# Patient Record
Sex: MALE | Race: WHITE | NOT HISPANIC OR LATINO
[De-identification: names, ages, dates, MRNs, and addresses within clinical notes are randomized per-mention and may not be internally consistent; named-entity substitution may affect disease eponyms.]

---

## 2019-02-05 ENCOUNTER — TRANSCRIPTION ENCOUNTER (OUTPATIENT)
Age: 54
End: 2019-02-05

## 2019-02-07 ENCOUNTER — TRANSCRIPTION ENCOUNTER (OUTPATIENT)
Age: 54
End: 2019-02-07

## 2019-03-08 ENCOUNTER — APPOINTMENT (OUTPATIENT)
Dept: FAMILY MEDICINE | Facility: CLINIC | Age: 54
End: 2019-03-08

## 2019-05-21 ENCOUNTER — APPOINTMENT (OUTPATIENT)
Dept: FAMILY MEDICINE | Facility: CLINIC | Age: 54
End: 2019-05-21
Payer: COMMERCIAL

## 2019-05-21 ENCOUNTER — NON-APPOINTMENT (OUTPATIENT)
Age: 54
End: 2019-05-21

## 2019-05-21 VITALS
SYSTOLIC BLOOD PRESSURE: 130 MMHG | BODY MASS INDEX: 28.77 KG/M2 | OXYGEN SATURATION: 99 % | WEIGHT: 179 LBS | TEMPERATURE: 98.4 F | DIASTOLIC BLOOD PRESSURE: 88 MMHG | HEART RATE: 84 BPM | HEIGHT: 66 IN

## 2019-05-21 PROCEDURE — 99212 OFFICE O/P EST SF 10 MIN: CPT

## 2019-05-21 NOTE — HISTORY OF PRESENT ILLNESS
[FreeTextEntry8] : Mr Clay Guerrero is a 54 yo male presents today to establish care. As per patient, he states he used to see Dr. Vuong  his previous PCP, however, recently he has been unable to contact or follow up with him, states that the practice has closed. Pt has prior medical history of anxiety and msk/chronic pain syndrome. Pt states he does not want a complete physical today and intends to return next month. Today all he wants is prescription renewal for his oxycodone 20 mg daily. Pt was advised due to his chronic pain syndrome, and his need for chronic pain therapy, pt is to be referred to pain management specialist. Pt made aware, we treat for acute pain, and as per guidelines since pt does not have any signs of acute severe injury and also not a cancer patient with a terminal diagnosis, a referral to pain management is in order. Pt also highly advised the dangers of combining xanax with oxycodone. Pt states he has had ongoing anxiety issues for many years. Pt advised a referral to behavioral management and psychiatrist is also optimal. \par Pt left abruptly, states he does not want a complete physical/blood work today, and would rather do it when he come in again in June 2019, upon which point he intends to get medications refilled as well.

## 2019-05-25 ENCOUNTER — TRANSCRIPTION ENCOUNTER (OUTPATIENT)
Age: 54
End: 2019-05-25

## 2019-06-14 ENCOUNTER — APPOINTMENT (OUTPATIENT)
Dept: FAMILY MEDICINE | Facility: CLINIC | Age: 54
End: 2019-06-14

## 2019-09-18 ENCOUNTER — APPOINTMENT (OUTPATIENT)
Dept: FAMILY MEDICINE | Facility: CLINIC | Age: 54
End: 2019-09-18
Payer: COMMERCIAL

## 2019-09-18 ENCOUNTER — RX RENEWAL (OUTPATIENT)
Age: 54
End: 2019-09-18

## 2019-09-18 VITALS
BODY MASS INDEX: 27.97 KG/M2 | SYSTOLIC BLOOD PRESSURE: 120 MMHG | TEMPERATURE: 98.5 F | OXYGEN SATURATION: 96 % | WEIGHT: 174 LBS | HEIGHT: 66 IN | HEART RATE: 88 BPM | DIASTOLIC BLOOD PRESSURE: 82 MMHG

## 2019-09-18 VITALS — RESPIRATION RATE: 14 BRPM

## 2019-09-18 PROCEDURE — 93000 ELECTROCARDIOGRAM COMPLETE: CPT

## 2019-09-18 PROCEDURE — 90674 CCIIV4 VAC NO PRSV 0.5 ML IM: CPT

## 2019-09-18 PROCEDURE — 99386 PREV VISIT NEW AGE 40-64: CPT | Mod: 25

## 2019-09-18 PROCEDURE — 36415 COLL VENOUS BLD VENIPUNCTURE: CPT

## 2019-09-18 PROCEDURE — G0008: CPT

## 2019-09-18 NOTE — HISTORY OF PRESENT ILLNESS
[FreeTextEntry1] : Comprehensive health maintenance, physical exam [de-identified] : Patient is a 53-year-old gentleman, who presents today for comprehensive health maintenance. Physical exam patient states that he is in his usual state of health, unfortunate patient has a chronic pain syndrome secondary to an injury which was sustained at work. Presently, the patient is awake, alert, and oriented x3 in no acute distress. He is calm and cooperative. He denies any chest pain, shortness of breath, nausea, or vomiting. Patient does complain of left shoulder and left ankle pain status post injury.

## 2019-09-18 NOTE — HISTORY OF PRESENT ILLNESS
[de-identified] : Patient is a 53-year-old gentleman, who presents today for comprehensive health maintenance. Physical exam patient states that he is in his usual state of health, unfortunate patient has a chronic pain syndrome secondary to an injury which was sustained at work. Presently, the patient is awake, alert, and oriented x3 in no acute distress. He is calm and cooperative. He denies any chest pain, shortness of breath, nausea, or vomiting. Patient does complain of left shoulder and left ankle pain status post injury. [FreeTextEntry1] : Comprehensive health maintenance, physical exam

## 2019-09-18 NOTE — REVIEW OF SYSTEMS
[Joint Pain] : joint pain [Joint Stiffness] : joint stiffness [Negative] : Heme/Lymph [Muscle Pain] : no muscle pain [Muscle Weakness] : no muscle weakness [Joint Swelling] : no joint swelling [Back Pain] : no back pain

## 2019-09-18 NOTE — COUNSELING
[Adequate lighting] : Adequate lighting [Fall prevention counseling provided] : Fall prevention counseling provided [No throw rugs] : No throw rugs [Use proper foot wear] : Use proper foot wear [Sleep ___ hours/day] : Sleep [unfilled] hours/day [Behavioral health counseling provided] : Behavioral health counseling provided [Plan in advance] : Plan in advance [Engage in a relaxing activity] : Engage in a relaxing activity [AUDIT-C Screening administered and reviewed] : AUDIT-C Screening administered and reviewed [None] : None [Good understanding] : Patient has a good understanding of lifestyle changes and steps needed to achieve self management goal

## 2019-09-18 NOTE — HEALTH RISK ASSESSMENT
[Good] : ~his/her~ current health as good [Very Good] : ~his/her~  mood as very good [2 - 4 times a month (2 pts)] : 2-4 times a month (2 points) [Yes] : Yes [Never (0 pts)] : Never (0 points) [1 or 2 (0 pts)] : 1 or 2 (0 points) [No] : In the past 12 months have you used drugs other than those required for medical reasons? No [Any fall with injury in past year] : Patient reported fall with injury in the past year [0] : 1) Little interest or pleasure doing things: Not at all (0) [Patient reported colonoscopy was abnormal] : Patient reported colonoscopy was abnormal [HIV Test offered] : HIV Test offered [Hepatitis C test offered] : Hepatitis C test offered [Alone] : lives alone [None] : None [Employed] : employed [High School] : high school [] :  [Sexually Active] : sexually active [Fully functional (bathing, dressing, toileting, transferring, walking, feeding)] : Fully functional (bathing, dressing, toileting, transferring, walking, feeding) [Feels Safe at Home] : Feels safe at home [Fully functional (using the telephone, shopping, preparing meals, housekeeping, doing laundry, using] : Fully functional and needs no help or supervision to perform IADLs (using the telephone, shopping, preparing meals, housekeeping, doing laundry, using transportation, managing medications and managing finances) [Reports normal functional visual acuity (ie: able to read med bottle)] : Reports normal functional visual acuity [Carbon Monoxide Detector] : carbon monoxide detector [Smoke Detector] : smoke detector [Safety elements used in home] : safety elements used in home [Sunscreen] : uses sunscreen [Seat Belt] :  uses seat belt [Reviewed updated] : Reviewed updated [Designated Healthcare Proxy] : Designated healthcare proxy [Name: ___] : Health Care Proxy's Name: [unfilled]  [Relationship: ___] : Relationship: [unfilled] [Aggressive treatment] : aggressive treatment [] : No [Audit-CScore] : 2 [[id number]] : 0 [Change in mental status noted] : No change in mental status noted [Language] : denies difficulty with language [Behavior] : denies difficulty with behavior [Learning/Retaining New Information] : denies difficulty learning/retaining new information [Handling Complex Tasks] : denies difficulty handling complex tasks [Reasoning] : denies difficulty with reasoning [Spatial Ability and Orientation] : denies difficulty with spatial ability and orientation [High Risk Behavior] : no high risk behavior [Reports changes in vision] : Reports no changes in vision [Reports changes in hearing] : Reports no changes in hearing [Reports changes in dental health] : Reports no changes in dental health [Guns at Home] : no guns at home [Travel to Developing Areas] : does not  travel to developing areas [Caregiver Concerns] : does not have caregiver concerns [TB Exposure] : is not being exposed to tuberculosis [ColonoscopyDate] : 09/17 [ColonoscopyComments] : Colitis [AdvancecareDate] : 09/19

## 2019-09-18 NOTE — HEALTH RISK ASSESSMENT
[Good] : ~his/her~ current health as good [Very Good] : ~his/her~  mood as very good [2 - 4 times a month (2 pts)] : 2-4 times a month (2 points) [Yes] : Yes [1 or 2 (0 pts)] : 1 or 2 (0 points) [Never (0 pts)] : Never (0 points) [Any fall with injury in past year] : Patient reported fall with injury in the past year [No] : In the past 12 months have you used drugs other than those required for medical reasons? No [0] : 1) Little interest or pleasure doing things: Not at all (0) [HIV Test offered] : HIV Test offered [Patient reported colonoscopy was abnormal] : Patient reported colonoscopy was abnormal [Hepatitis C test offered] : Hepatitis C test offered [Alone] : lives alone [None] : None [Employed] : employed [High School] : high school [] :  [Sexually Active] : sexually active [Fully functional (bathing, dressing, toileting, transferring, walking, feeding)] : Fully functional (bathing, dressing, toileting, transferring, walking, feeding) [Feels Safe at Home] : Feels safe at home [Fully functional (using the telephone, shopping, preparing meals, housekeeping, doing laundry, using] : Fully functional and needs no help or supervision to perform IADLs (using the telephone, shopping, preparing meals, housekeeping, doing laundry, using transportation, managing medications and managing finances) [Reports normal functional visual acuity (ie: able to read med bottle)] : Reports normal functional visual acuity [Carbon Monoxide Detector] : carbon monoxide detector [Smoke Detector] : smoke detector [Safety elements used in home] : safety elements used in home [Seat Belt] :  uses seat belt [Sunscreen] : uses sunscreen [Reviewed updated] : Reviewed updated [Name: ___] : Health Care Proxy's Name: [unfilled]  [Designated Healthcare Proxy] : Designated healthcare proxy [Aggressive treatment] : aggressive treatment [Relationship: ___] : Relationship: [unfilled] [Audit-CScore] : 2 [] : No [[id number]] : 0 [Change in mental status noted] : No change in mental status noted [Language] : denies difficulty with language [Behavior] : denies difficulty with behavior [Learning/Retaining New Information] : denies difficulty learning/retaining new information [Reasoning] : denies difficulty with reasoning [Handling Complex Tasks] : denies difficulty handling complex tasks [Spatial Ability and Orientation] : denies difficulty with spatial ability and orientation [High Risk Behavior] : no high risk behavior [Reports changes in hearing] : Reports no changes in hearing [Reports changes in vision] : Reports no changes in vision [Guns at Home] : no guns at home [Reports changes in dental health] : Reports no changes in dental health [Travel to Developing Areas] : does not  travel to developing areas [Caregiver Concerns] : does not have caregiver concerns [TB Exposure] : is not being exposed to tuberculosis [ColonoscopyComments] : Colitis [ColonoscopyDate] : 09/17 [AdvancecareDate] : 09/19

## 2019-09-18 NOTE — PHYSICAL EXAM
[No Acute Distress] : no acute distress [Well Nourished] : well nourished [Well Developed] : well developed [Normal Voice/Communication] : normal voice/communication [Well-Appearing] : well-appearing [PERRL] : pupils equal round and reactive to light [Normal Sclera/Conjunctiva] : normal sclera/conjunctiva [Normal Outer Ear/Nose] : the outer ears and nose were normal in appearance [EOMI] : extraocular movements intact [Normal Oropharynx] : the oropharynx was normal [Normal TMs] : both tympanic membranes were normal [No JVD] : no jugular venous distention [Normal Nasal Mucosa] : the nasal mucosa was normal [Supple] : supple [No Lymphadenopathy] : no lymphadenopathy [No Respiratory Distress] : no respiratory distress  [Thyroid Normal, No Nodules] : the thyroid was normal and there were no nodules present [No Accessory Muscle Use] : no accessory muscle use [Clear to Auscultation] : lungs were clear to auscultation bilaterally [Normal Rate] : normal rate  [Regular Rhythm] : with a regular rhythm [Normal S1, S2] : normal S1 and S2 [No Murmur] : no murmur heard [No Carotid Bruits] : no carotid bruits [No Varicosities] : no varicosities [No Abdominal Bruit] : a ~M bruit was not heard ~T in the abdomen [No Edema] : there was no peripheral edema [Pedal Pulses Present] : the pedal pulses are present [No Extremity Clubbing/Cyanosis] : no extremity clubbing/cyanosis [No Palpable Aorta] : no palpable aorta [Soft] : abdomen soft [Non-distended] : non-distended [Non Tender] : non-tender [No Masses] : no abdominal mass palpated [No HSM] : no HSM [No Hernias] : no hernias [Normal Bowel Sounds] : normal bowel sounds [Declined Rectal Exam] : declined rectal exam [Normal Supraclavicular Nodes] : no supraclavicular lymphadenopathy [Normal Posterior Cervical Nodes] : no posterior cervical lymphadenopathy [Normal Axillary Nodes] : no axillary lymphadenopathy [Normal Anterior Cervical Nodes] : no anterior cervical lymphadenopathy [Normal Inguinal Nodes] : no inguinal lymphadenopathy [Normal Femoral Nodes] : no femoral lymphadenopathy [No CVA Tenderness] : no CVA  tenderness [No Spinal Tenderness] : no spinal tenderness [Grossly Normal Strength/Tone] : grossly normal strength/tone [No Joint Swelling] : no joint swelling [No Rash] : no rash [No Focal Deficits] : no focal deficits [Coordination Grossly Intact] : coordination grossly intact [Deep Tendon Reflexes (DTR)] : deep tendon reflexes were 2+ and symmetric [Normal Gait] : normal gait [Normal Affect] : the affect was normal [Speech Grossly Normal] : speech grossly normal [Alert and Oriented x3] : oriented to person, place, and time [Normal Mood] : the mood was normal [Normal Insight/Judgement] : insight and judgment were intact [de-identified] : Left shoulder range of motion decreased secondary to pain, status post fall work injury torn tendons [de-identified] : Positive Tinel sign, occult tunnel syndrome, and cubital tunnel syndrome

## 2019-09-18 NOTE — PHYSICAL EXAM
[No Acute Distress] : no acute distress [Well Nourished] : well nourished [Well Developed] : well developed [Well-Appearing] : well-appearing [Normal Voice/Communication] : normal voice/communication [Normal Sclera/Conjunctiva] : normal sclera/conjunctiva [PERRL] : pupils equal round and reactive to light [EOMI] : extraocular movements intact [Normal Outer Ear/Nose] : the outer ears and nose were normal in appearance [Normal TMs] : both tympanic membranes were normal [Normal Oropharynx] : the oropharynx was normal [Normal Nasal Mucosa] : the nasal mucosa was normal [No JVD] : no jugular venous distention [Supple] : supple [No Lymphadenopathy] : no lymphadenopathy [No Respiratory Distress] : no respiratory distress  [Thyroid Normal, No Nodules] : the thyroid was normal and there were no nodules present [Clear to Auscultation] : lungs were clear to auscultation bilaterally [No Accessory Muscle Use] : no accessory muscle use [Regular Rhythm] : with a regular rhythm [Normal Rate] : normal rate  [Normal S1, S2] : normal S1 and S2 [No Carotid Bruits] : no carotid bruits [No Murmur] : no murmur heard [No Abdominal Bruit] : a ~M bruit was not heard ~T in the abdomen [No Varicosities] : no varicosities [Pedal Pulses Present] : the pedal pulses are present [No Edema] : there was no peripheral edema [No Palpable Aorta] : no palpable aorta [No Extremity Clubbing/Cyanosis] : no extremity clubbing/cyanosis [Soft] : abdomen soft [Non-distended] : non-distended [Non Tender] : non-tender [No HSM] : no HSM [No Masses] : no abdominal mass palpated [No Hernias] : no hernias [Normal Bowel Sounds] : normal bowel sounds [Declined Rectal Exam] : declined rectal exam [Normal Supraclavicular Nodes] : no supraclavicular lymphadenopathy [Normal Posterior Cervical Nodes] : no posterior cervical lymphadenopathy [Normal Axillary Nodes] : no axillary lymphadenopathy [Normal Anterior Cervical Nodes] : no anterior cervical lymphadenopathy [Normal Inguinal Nodes] : no inguinal lymphadenopathy [Normal Femoral Nodes] : no femoral lymphadenopathy [No Spinal Tenderness] : no spinal tenderness [No CVA Tenderness] : no CVA  tenderness [Grossly Normal Strength/Tone] : grossly normal strength/tone [No Joint Swelling] : no joint swelling [No Rash] : no rash [No Focal Deficits] : no focal deficits [Coordination Grossly Intact] : coordination grossly intact [Deep Tendon Reflexes (DTR)] : deep tendon reflexes were 2+ and symmetric [Normal Gait] : normal gait [Speech Grossly Normal] : speech grossly normal [Normal Affect] : the affect was normal [Alert and Oriented x3] : oriented to person, place, and time [Normal Mood] : the mood was normal [Normal Insight/Judgement] : insight and judgment were intact [de-identified] : Left shoulder range of motion decreased secondary to pain, status post fall work injury torn tendons [de-identified] : Positive Tinel sign, occult tunnel syndrome, and cubital tunnel syndrome

## 2019-09-18 NOTE — COUNSELING
[Fall prevention counseling provided] : Fall prevention counseling provided [Adequate lighting] : Adequate lighting [Use proper foot wear] : Use proper foot wear [No throw rugs] : No throw rugs [Behavioral health counseling provided] : Behavioral health counseling provided [Sleep ___ hours/day] : Sleep [unfilled] hours/day [Plan in advance] : Plan in advance [Engage in a relaxing activity] : Engage in a relaxing activity [None] : None [AUDIT-C Screening administered and reviewed] : AUDIT-C Screening administered and reviewed [Good understanding] : Patient has a good understanding of lifestyle changes and steps needed to achieve self management goal

## 2019-09-18 NOTE — ASSESSMENT
[FreeTextEntry1] : Assessment and plan:\par \par 1. Chronic pain syndrome, status post fall at work toward shoulder tendons left side with residual paresthesia of left arm status post EMG studies show that the patient has cubital tunnel syndrome and carpal tunnel syndrome Adderall and detailed discussion with patient regarding pain management. We will attempt tramadol 50 mg up to 4 times a day seven-day supply will be given. I stopped check. Patient has not had any prescribed opioids. It has been months.\par \par 2. Electrocardiogram shows no acute ST-T wave changes. Unfortunately no old electrocardiogram to compare.\par \par 3. Comprehensive blood work obtained.\par \par 4. Health maintenance issues discussed with patient. He is up-to-date with his colonoscopy. It was recommended by his gastroenterologist. Followup in 5 years. His colonoscopy was done 2 years ago there was a mild colitis. Patient states he is feeling well totally asymptomatic. Gastroenterology had recommended followup only if symptoms worsened or persisted. They have resolved.\par \par 5. Influenza vaccine administered

## 2019-09-19 LAB
24R-OH-CALCIDIOL SERPL-MCNC: 60.4 PG/ML
25(OH)D3 SERPL-MCNC: 47.6 NG/ML
ALBUMIN SERPL ELPH-MCNC: 4.6 G/DL
ALP BLD-CCNC: 67 U/L
ALT SERPL-CCNC: 24 U/L
ANION GAP SERPL CALC-SCNC: 17 MMOL/L
APPEARANCE: CLEAR
AST SERPL-CCNC: 25 U/L
BACTERIA: NEGATIVE
BASOPHILS # BLD AUTO: 0.04 K/UL
BASOPHILS NFR BLD AUTO: 0.6 %
BILIRUB SERPL-MCNC: 0.2 MG/DL
BILIRUBIN URINE: NEGATIVE
BLOOD URINE: NEGATIVE
BUN SERPL-MCNC: 21 MG/DL
CALCIUM SERPL-MCNC: 9.6 MG/DL
CHLORIDE SERPL-SCNC: 102 MMOL/L
CHOLEST SERPL-MCNC: 178 MG/DL
CHOLEST/HDLC SERPL: 3.2 RATIO
CO2 SERPL-SCNC: 22 MMOL/L
COLOR: YELLOW
CREAT SERPL-MCNC: 1.22 MG/DL
EOSINOPHIL # BLD AUTO: 0.08 K/UL
EOSINOPHIL NFR BLD AUTO: 1.2 %
ESTIMATED AVERAGE GLUCOSE: 114 MG/DL
GLUCOSE QUALITATIVE U: NEGATIVE
GLUCOSE SERPL-MCNC: 112 MG/DL
HBA1C MFR BLD HPLC: 5.6 %
HCT VFR BLD CALC: 47 %
HCV AB SER QL: NONREACTIVE
HCV S/CO RATIO: 0.12 S/CO
HDLC SERPL-MCNC: 55 MG/DL
HGB BLD-MCNC: 15.7 G/DL
HIV1+2 AB SPEC QL IA.RAPID: NONREACTIVE
HYALINE CASTS: 0 /LPF
IMM GRANULOCYTES NFR BLD AUTO: 0.2 %
KETONES URINE: NEGATIVE
LDLC SERPL CALC-MCNC: 61 MG/DL
LEUKOCYTE ESTERASE URINE: NEGATIVE
LYMPHOCYTES # BLD AUTO: 1.86 K/UL
LYMPHOCYTES NFR BLD AUTO: 28.6 %
MAN DIFF?: NORMAL
MCHC RBC-ENTMCNC: 30.5 PG
MCHC RBC-ENTMCNC: 33.4 GM/DL
MCV RBC AUTO: 91.4 FL
MICROSCOPIC-UA: NORMAL
MONOCYTES # BLD AUTO: 0.56 K/UL
MONOCYTES NFR BLD AUTO: 8.6 %
NEUTROPHILS # BLD AUTO: 3.95 K/UL
NEUTROPHILS NFR BLD AUTO: 60.8 %
NITRITE URINE: NEGATIVE
PH URINE: 6
PLATELET # BLD AUTO: 295 K/UL
POTASSIUM SERPL-SCNC: 4 MMOL/L
PROT SERPL-MCNC: 6.9 G/DL
PROTEIN URINE: NEGATIVE
PSA SERPL-MCNC: 0.74 NG/ML
RBC # BLD: 5.14 M/UL
RBC # FLD: 13.4 %
RED BLOOD CELLS URINE: 0 /HPF
SODIUM SERPL-SCNC: 141 MMOL/L
SPECIFIC GRAVITY URINE: 1.02
SQUAMOUS EPITHELIAL CELLS: 0 /HPF
T4 FREE SERPL-MCNC: 1.3 NG/DL
TRIGL SERPL-MCNC: 310 MG/DL
TSH SERPL-ACNC: 0.71 UIU/ML
UROBILINOGEN URINE: NORMAL
WBC # FLD AUTO: 6.5 K/UL
WHITE BLOOD CELLS URINE: 1 /HPF

## 2019-10-11 ENCOUNTER — APPOINTMENT (OUTPATIENT)
Dept: FAMILY MEDICINE | Facility: CLINIC | Age: 54
End: 2019-10-11
Payer: COMMERCIAL

## 2019-10-11 VITALS
HEIGHT: 66 IN | SYSTOLIC BLOOD PRESSURE: 128 MMHG | RESPIRATION RATE: 14 BRPM | WEIGHT: 174 LBS | DIASTOLIC BLOOD PRESSURE: 70 MMHG | HEART RATE: 80 BPM | BODY MASS INDEX: 27.97 KG/M2

## 2019-10-11 DIAGNOSIS — T14.8XXA OTHER INJURY OF UNSPECIFIED BODY REGION, INITIAL ENCOUNTER: ICD-10-CM

## 2019-10-11 PROCEDURE — 99214 OFFICE O/P EST MOD 30 MIN: CPT

## 2019-10-11 RX ORDER — TRAMADOL HYDROCHLORIDE 50 MG/1
50 TABLET, COATED ORAL
Qty: 28 | Refills: 0 | Status: DISCONTINUED | COMMUNITY
Start: 2019-09-18 | End: 2019-10-11

## 2019-10-11 NOTE — ASSESSMENT
[FreeTextEntry1] : Assessment and plan:\par \par Status post work injury. Range of motion left upper extremity significantly reduced. Patient has been evaluated by orthopedic surgery, which has evaluated the patient at 30% disability left upper extremity. We have attempted conservative management with nonsteroidal anti-inflammatory drugs and tramadol without success.\par \par Pain management. I will attempt oxycodone 5 mg every 6 hours as needed for pain. A one-week supply will be given to the patient and the patient will be evaluated by pain management specialist. Detailed discussion with patient regarding pain management and also the addictive nature of oxycodone. Humberto. The patient is in severe pain. We will attempt to relieve the pain. Patient will take medication sparingly. I stopped checked. There is no sign of abuse. There is no abnormal behavior.

## 2019-10-11 NOTE — PHYSICAL EXAM
[No Acute Distress] : no acute distress [Well Nourished] : well nourished [Well Developed] : well developed [Well-Appearing] : well-appearing [Normal Voice/Communication] : normal voice/communication [Normal Sclera/Conjunctiva] : normal sclera/conjunctiva [PERRL] : pupils equal round and reactive to light [EOMI] : extraocular movements intact [Normal Outer Ear/Nose] : the outer ears and nose were normal in appearance [Normal Oropharynx] : the oropharynx was normal [Normal TMs] : both tympanic membranes were normal [Normal Nasal Mucosa] : the nasal mucosa was normal [No JVD] : no jugular venous distention [No Lymphadenopathy] : no lymphadenopathy [Supple] : supple [Thyroid Normal, No Nodules] : the thyroid was normal and there were no nodules present [No Respiratory Distress] : no respiratory distress  [No Accessory Muscle Use] : no accessory muscle use [Clear to Auscultation] : lungs were clear to auscultation bilaterally [Normal Rate] : normal rate  [Regular Rhythm] : with a regular rhythm [Normal S1, S2] : normal S1 and S2 [No Murmur] : no murmur heard [No Carotid Bruits] : no carotid bruits [No Abdominal Bruit] : a ~M bruit was not heard ~T in the abdomen [No Varicosities] : no varicosities [Pedal Pulses Present] : the pedal pulses are present [No Edema] : there was no peripheral edema [No Palpable Aorta] : no palpable aorta [No Extremity Clubbing/Cyanosis] : no extremity clubbing/cyanosis [Soft] : abdomen soft [Non Tender] : non-tender [Non-distended] : non-distended [No Masses] : no abdominal mass palpated [No HSM] : no HSM [Normal Bowel Sounds] : normal bowel sounds [No Hernias] : no hernias [Declined Rectal Exam] : declined rectal exam [Normal Supraclavicular Nodes] : no supraclavicular lymphadenopathy [Normal Axillary Nodes] : no axillary lymphadenopathy [Normal Posterior Cervical Nodes] : no posterior cervical lymphadenopathy [Normal Anterior Cervical Nodes] : no anterior cervical lymphadenopathy [Normal Inguinal Nodes] : no inguinal lymphadenopathy [Normal Femoral Nodes] : no femoral lymphadenopathy [No CVA Tenderness] : no CVA  tenderness [No Spinal Tenderness] : no spinal tenderness [No Joint Swelling] : no joint swelling [Grossly Normal Strength/Tone] : grossly normal strength/tone [No Rash] : no rash [Coordination Grossly Intact] : coordination grossly intact [No Focal Deficits] : no focal deficits [Normal Gait] : normal gait [Deep Tendon Reflexes (DTR)] : deep tendon reflexes were 2+ and symmetric [Speech Grossly Normal] : speech grossly normal [Normal Affect] : the affect was normal [Alert and Oriented x3] : oriented to person, place, and time [Normal Mood] : the mood was normal [Normal Insight/Judgement] : insight and judgment were intact [de-identified] : Positive Tinel sign, occult tunnel syndrome, and cubital tunnel syndrome [de-identified] : Left shoulder range of motion decreased secondary to pain, status post fall work injury torn tendons

## 2019-10-11 NOTE — HISTORY OF PRESENT ILLNESS
[FreeTextEntry1] : Severe left shoulder pain. Range of motion significantly reduced [de-identified] : This is a 54-year-old gentleman presents for followup. Unfortunately, the patient has chronic left shoulder pain. Patient has been evaluated by orthopedic surgery in fact, he has a 30% disability of left shoulder. This stems from the fact that the patient had a work injury which occurred October of 2018. We have attempted conservative management nonsteroidals help some, tramadol patient did not tolerate caused nausea. He did gain relief with oxycodone in the past.

## 2019-11-13 ENCOUNTER — APPOINTMENT (OUTPATIENT)
Dept: FAMILY MEDICINE | Facility: CLINIC | Age: 54
End: 2019-11-13
Payer: COMMERCIAL

## 2019-11-13 VITALS
HEART RATE: 82 BPM | HEIGHT: 66 IN | WEIGHT: 174 LBS | SYSTOLIC BLOOD PRESSURE: 100 MMHG | BODY MASS INDEX: 27.97 KG/M2 | OXYGEN SATURATION: 98 % | TEMPERATURE: 98.6 F | DIASTOLIC BLOOD PRESSURE: 70 MMHG

## 2019-11-13 PROCEDURE — 99213 OFFICE O/P EST LOW 20 MIN: CPT

## 2019-11-13 NOTE — REVIEW OF SYSTEMS
[Muscle Pain] : no muscle pain [Joint Pain] : joint pain [Joint Stiffness] : joint stiffness [Muscle Weakness] : no muscle weakness [Back Pain] : no back pain [Joint Swelling] : no joint swelling [Negative] : Psychiatric

## 2019-11-13 NOTE — PHYSICAL EXAM
[No Acute Distress] : no acute distress [Well Developed] : well developed [Well Nourished] : well nourished [Well-Appearing] : well-appearing [Normal Sclera/Conjunctiva] : normal sclera/conjunctiva [Normal Voice/Communication] : normal voice/communication [PERRL] : pupils equal round and reactive to light [EOMI] : extraocular movements intact [Normal Outer Ear/Nose] : the outer ears and nose were normal in appearance [Normal Oropharynx] : the oropharynx was normal [Normal TMs] : both tympanic membranes were normal [No Lymphadenopathy] : no lymphadenopathy [Normal Nasal Mucosa] : the nasal mucosa was normal [No JVD] : no jugular venous distention [Thyroid Normal, No Nodules] : the thyroid was normal and there were no nodules present [Supple] : supple [No Respiratory Distress] : no respiratory distress  [No Accessory Muscle Use] : no accessory muscle use [Clear to Auscultation] : lungs were clear to auscultation bilaterally [Regular Rhythm] : with a regular rhythm [Normal S1, S2] : normal S1 and S2 [Normal Rate] : normal rate  [No Murmur] : no murmur heard [No Carotid Bruits] : no carotid bruits [No Abdominal Bruit] : a ~M bruit was not heard ~T in the abdomen [Pedal Pulses Present] : the pedal pulses are present [No Varicosities] : no varicosities [No Edema] : there was no peripheral edema [No Palpable Aorta] : no palpable aorta [No Extremity Clubbing/Cyanosis] : no extremity clubbing/cyanosis [Non-distended] : non-distended [Soft] : abdomen soft [Non Tender] : non-tender [No HSM] : no HSM [No Masses] : no abdominal mass palpated [Normal Bowel Sounds] : normal bowel sounds [Declined Rectal Exam] : declined rectal exam [No Hernias] : no hernias [Normal Supraclavicular Nodes] : no supraclavicular lymphadenopathy [Normal Axillary Nodes] : no axillary lymphadenopathy [Normal Posterior Cervical Nodes] : no posterior cervical lymphadenopathy [Normal Inguinal Nodes] : no inguinal lymphadenopathy [Normal Anterior Cervical Nodes] : no anterior cervical lymphadenopathy [No Spinal Tenderness] : no spinal tenderness [No CVA Tenderness] : no CVA  tenderness [Normal Femoral Nodes] : no femoral lymphadenopathy [Grossly Normal Strength/Tone] : grossly normal strength/tone [No Rash] : no rash [No Joint Swelling] : no joint swelling [Coordination Grossly Intact] : coordination grossly intact [No Focal Deficits] : no focal deficits [Normal Gait] : normal gait [Deep Tendon Reflexes (DTR)] : deep tendon reflexes were 2+ and symmetric [Speech Grossly Normal] : speech grossly normal [Normal Affect] : the affect was normal [Normal Mood] : the mood was normal [Normal Insight/Judgement] : insight and judgment were intact [Alert and Oriented x3] : oriented to person, place, and time [de-identified] : Left shoulder range of motion decreased secondary to pain, status post fall work injury torn tendons [de-identified] : Positive Tinel sign, carpal tunnel syndrome, and cubital tunnel syndrome

## 2019-11-13 NOTE — HISTORY OF PRESENT ILLNESS
[FreeTextEntry1] : Severe left shoulder pain, controled with NSAID and Oxycodone [de-identified] : This is a 54-year-old gentleman presents for followup. Unfortunately, the patient has chronic left shoulder pain. Patient has been evaluated by orthopedic surgery in fact, he has a 30% disability of left shoulder. This stems from the fact that the patient had a work injury which occurred October of 2018. We have attempted conservative management nonsteroidals help some, tramadol patient did not tolerate caused nausea. He did gain relief with oxycodone.

## 2019-12-07 ENCOUNTER — APPOINTMENT (OUTPATIENT)
Dept: FAMILY MEDICINE | Facility: CLINIC | Age: 54
End: 2019-12-07
Payer: COMMERCIAL

## 2019-12-07 VITALS
DIASTOLIC BLOOD PRESSURE: 70 MMHG | SYSTOLIC BLOOD PRESSURE: 112 MMHG | HEIGHT: 66 IN | BODY MASS INDEX: 27.97 KG/M2 | HEART RATE: 67 BPM | RESPIRATION RATE: 14 BRPM | OXYGEN SATURATION: 96 % | WEIGHT: 174 LBS

## 2019-12-07 PROCEDURE — 99213 OFFICE O/P EST LOW 20 MIN: CPT

## 2019-12-07 NOTE — PHYSICAL EXAM
[No Acute Distress] : no acute distress [Well Nourished] : well nourished [Well Developed] : well developed [Well-Appearing] : well-appearing [Normal Voice/Communication] : normal voice/communication [Normal Sclera/Conjunctiva] : normal sclera/conjunctiva [PERRL] : pupils equal round and reactive to light [EOMI] : extraocular movements intact [Normal Outer Ear/Nose] : the outer ears and nose were normal in appearance [Normal Oropharynx] : the oropharynx was normal [Normal TMs] : both tympanic membranes were normal [Normal Nasal Mucosa] : the nasal mucosa was normal [No JVD] : no jugular venous distention [No Lymphadenopathy] : no lymphadenopathy [Supple] : supple [Thyroid Normal, No Nodules] : the thyroid was normal and there were no nodules present [No Respiratory Distress] : no respiratory distress  [No Accessory Muscle Use] : no accessory muscle use [Clear to Auscultation] : lungs were clear to auscultation bilaterally [Normal Rate] : normal rate  [Regular Rhythm] : with a regular rhythm [Normal S1, S2] : normal S1 and S2 [No Murmur] : no murmur heard [No Carotid Bruits] : no carotid bruits [No Abdominal Bruit] : a ~M bruit was not heard ~T in the abdomen [No Varicosities] : no varicosities [Pedal Pulses Present] : the pedal pulses are present [No Edema] : there was no peripheral edema [No Palpable Aorta] : no palpable aorta [No Extremity Clubbing/Cyanosis] : no extremity clubbing/cyanosis [Soft] : abdomen soft [Non Tender] : non-tender [Non-distended] : non-distended [No Masses] : no abdominal mass palpated [No HSM] : no HSM [Normal Bowel Sounds] : normal bowel sounds [No Hernias] : no hernias [Declined Rectal Exam] : declined rectal exam [Normal Supraclavicular Nodes] : no supraclavicular lymphadenopathy [Normal Axillary Nodes] : no axillary lymphadenopathy [Normal Posterior Cervical Nodes] : no posterior cervical lymphadenopathy [Normal Anterior Cervical Nodes] : no anterior cervical lymphadenopathy [Normal Inguinal Nodes] : no inguinal lymphadenopathy [Normal Femoral Nodes] : no femoral lymphadenopathy [No CVA Tenderness] : no CVA  tenderness [No Spinal Tenderness] : no spinal tenderness [No Joint Swelling] : no joint swelling [Grossly Normal Strength/Tone] : grossly normal strength/tone [No Rash] : no rash [Coordination Grossly Intact] : coordination grossly intact [No Focal Deficits] : no focal deficits [Normal Gait] : normal gait [Deep Tendon Reflexes (DTR)] : deep tendon reflexes were 2+ and symmetric [Speech Grossly Normal] : speech grossly normal [Normal Affect] : the affect was normal [Alert and Oriented x3] : oriented to person, place, and time [Normal Mood] : the mood was normal [Normal Insight/Judgement] : insight and judgment were intact [de-identified] : Positive Tinel sign, carpal tunnel syndrome, and cubital tunnel syndrome [de-identified] : Left shoulder range of motion decreased secondary to pain, status post fall work injury torn tendons

## 2019-12-07 NOTE — REVIEW OF SYSTEMS
[Joint Pain] : joint pain [Joint Stiffness] : joint stiffness [Negative] : Heme/Lymph [Muscle Pain] : no muscle pain [Back Pain] : no back pain [Muscle Weakness] : no muscle weakness [Joint Swelling] : no joint swelling

## 2019-12-07 NOTE — HISTORY OF PRESENT ILLNESS
[de-identified] : Is a 54-year-old gentleman, who presents today for followup. Patient has chronic pain syndrome secondary to chronic left shoulder pain and chronic numbness and tingling in his left arm status post a work related injury. Patient's pain is relieved with oxycodone. Patient has been taking medication sparingly and only as needed [FreeTextEntry1] : Please see history of present illness

## 2019-12-07 NOTE — ASSESSMENT
[FreeTextEntry1] : Assessment and plan:\par \par Chronic pain syndrome. We will continue present medical management. I stopped checked. There is no sign of abuse. Patient has been taking medications as prescribed. Has not seen pain management, yet. Detailed discussion with patient regarding the addictive nature of opioids. He is aware but he says in order for him to function and continue his present employment. He needs to have his pain under control. There is no abnormal behavior. There is no doctor shopping. I have been the only physician writing for the patient's pain medications.

## 2019-12-27 ENCOUNTER — RX RENEWAL (OUTPATIENT)
Age: 54
End: 2019-12-27

## 2019-12-27 ENCOUNTER — APPOINTMENT (OUTPATIENT)
Dept: FAMILY MEDICINE | Facility: CLINIC | Age: 54
End: 2019-12-27
Payer: COMMERCIAL

## 2019-12-27 VITALS
WEIGHT: 174 LBS | DIASTOLIC BLOOD PRESSURE: 70 MMHG | TEMPERATURE: 98.2 F | HEIGHT: 66 IN | HEART RATE: 88 BPM | OXYGEN SATURATION: 97 % | BODY MASS INDEX: 27.97 KG/M2 | SYSTOLIC BLOOD PRESSURE: 110 MMHG | RESPIRATION RATE: 14 BRPM

## 2019-12-27 PROCEDURE — 99213 OFFICE O/P EST LOW 20 MIN: CPT

## 2019-12-27 NOTE — PHYSICAL EXAM
[No Acute Distress] : no acute distress [Well Developed] : well developed [Well Nourished] : well nourished [Well-Appearing] : well-appearing [Normal Voice/Communication] : normal voice/communication [Normal Sclera/Conjunctiva] : normal sclera/conjunctiva [EOMI] : extraocular movements intact [PERRL] : pupils equal round and reactive to light [Normal TMs] : both tympanic membranes were normal [Normal Oropharynx] : the oropharynx was normal [Normal Outer Ear/Nose] : the outer ears and nose were normal in appearance [No JVD] : no jugular venous distention [Normal Nasal Mucosa] : the nasal mucosa was normal [Supple] : supple [No Lymphadenopathy] : no lymphadenopathy [Thyroid Normal, No Nodules] : the thyroid was normal and there were no nodules present [No Respiratory Distress] : no respiratory distress  [No Accessory Muscle Use] : no accessory muscle use [Clear to Auscultation] : lungs were clear to auscultation bilaterally [Normal Rate] : normal rate  [Regular Rhythm] : with a regular rhythm [Normal S1, S2] : normal S1 and S2 [No Carotid Bruits] : no carotid bruits [No Murmur] : no murmur heard [No Abdominal Bruit] : a ~M bruit was not heard ~T in the abdomen [No Varicosities] : no varicosities [Pedal Pulses Present] : the pedal pulses are present [No Edema] : there was no peripheral edema [No Palpable Aorta] : no palpable aorta [No Extremity Clubbing/Cyanosis] : no extremity clubbing/cyanosis [Soft] : abdomen soft [Non-distended] : non-distended [No Masses] : no abdominal mass palpated [Non Tender] : non-tender [Normal Bowel Sounds] : normal bowel sounds [No HSM] : no HSM [No Hernias] : no hernias [Declined Rectal Exam] : declined rectal exam [Normal Supraclavicular Nodes] : no supraclavicular lymphadenopathy [Normal Axillary Nodes] : no axillary lymphadenopathy [Normal Posterior Cervical Nodes] : no posterior cervical lymphadenopathy [Normal Anterior Cervical Nodes] : no anterior cervical lymphadenopathy [Normal Inguinal Nodes] : no inguinal lymphadenopathy [Normal Femoral Nodes] : no femoral lymphadenopathy [No CVA Tenderness] : no CVA  tenderness [No Spinal Tenderness] : no spinal tenderness [No Joint Swelling] : no joint swelling [Grossly Normal Strength/Tone] : grossly normal strength/tone [No Rash] : no rash [Coordination Grossly Intact] : coordination grossly intact [No Focal Deficits] : no focal deficits [Normal Gait] : normal gait [Deep Tendon Reflexes (DTR)] : deep tendon reflexes were 2+ and symmetric [Speech Grossly Normal] : speech grossly normal [Alert and Oriented x3] : oriented to person, place, and time [Normal Affect] : the affect was normal [Normal Mood] : the mood was normal [Normal Insight/Judgement] : insight and judgment were intact [de-identified] : Left shoulder range of motion decreased secondary to pain, status post fall work injury torn tendons [de-identified] : Positive Tinel sign, carpal tunnel syndrome, and cubital tunnel syndrome

## 2019-12-27 NOTE — HISTORY OF PRESENT ILLNESS
[FreeTextEntry1] : Please see history of present illness [de-identified] : Is a 54-year-old gentleman, who presents today for followup. Patient has chronic pain syndrome secondary to chronic left shoulder pain and chronic numbness and tingling in his left arm status post a work related injury. Patient's pain is relieved with oxycodone. Patient has been taking medication sparingly and only as needed.Patient still complaining of relatively severe left shoulder pain, numbness and tingling in left arm and left thumb

## 2019-12-27 NOTE — ASSESSMENT
[FreeTextEntry1] : Assessment and plan:\par \par Chronic pain syndrome. We will continue present medical management. I stop checked. There is no sign of abuse. Patient has been taking medications as prescribed. Has not seen pain management, yet. Detailed discussion with patient regarding the addictive nature of opioids. He is aware but he says in order for him to function and continue his present employment. He needs to have his pain under control. There is no abnormal behavior. There is no doctor shopping. I have been the only physician writing for the patient's pain medications.\par \par The patient has been coming in every 2-3 weeks for pain. Medications I prescribed a full month's supply. Detailed discussion with patient regarding medications to use them sparingly and only as needed and next visit. I will obtain urine analysis.

## 2019-12-27 NOTE — REVIEW OF SYSTEMS
[Joint Stiffness] : joint stiffness [Joint Pain] : joint pain [Negative] : Heme/Lymph [Muscle Pain] : no muscle pain [Muscle Weakness] : no muscle weakness [Joint Swelling] : no joint swelling [Back Pain] : no back pain [de-identified] : Paresthesia left arm

## 2020-01-23 ENCOUNTER — APPOINTMENT (OUTPATIENT)
Dept: FAMILY MEDICINE | Facility: CLINIC | Age: 55
End: 2020-01-23
Payer: COMMERCIAL

## 2020-01-23 VITALS
TEMPERATURE: 98.4 F | OXYGEN SATURATION: 98 % | BODY MASS INDEX: 27.8 KG/M2 | HEART RATE: 99 BPM | WEIGHT: 173 LBS | DIASTOLIC BLOOD PRESSURE: 84 MMHG | SYSTOLIC BLOOD PRESSURE: 112 MMHG | HEIGHT: 66 IN

## 2020-01-23 DIAGNOSIS — R53.83 OTHER MALAISE: ICD-10-CM

## 2020-01-23 DIAGNOSIS — R53.81 OTHER MALAISE: ICD-10-CM

## 2020-01-23 PROCEDURE — 99213 OFFICE O/P EST LOW 20 MIN: CPT

## 2020-01-23 NOTE — REVIEW OF SYSTEMS
[Joint Pain] : joint pain [Joint Stiffness] : joint stiffness [Negative] : Heme/Lymph [Muscle Weakness] : no muscle weakness [Muscle Pain] : no muscle pain [Back Pain] : no back pain [de-identified] : Paresthesia left arm [Joint Swelling] : no joint swelling

## 2020-01-23 NOTE — HISTORY OF PRESENT ILLNESS
[de-identified] : Patient is a 54-year-old gentleman presents today for followup. Patient's medical history is significant for generalized anxiety, chronic left shoulder pain, chronic pain syndrome, numbness and tingling in his left arm. He also has a history of cubital tunnel syndrome on the left. Patient's chief complaint today is chest congestion, generalized malaise. He feels that he has a bug. He denies any chest pain, shortness of breath, nausea, vomiting. He presently denies any cough. He denies any mucus production. [FreeTextEntry1] : Please see history of present illness

## 2020-01-23 NOTE — ASSESSMENT
[FreeTextEntry1] : Assessment and plan:\par \par It appears that the patient has a viral syndrome. There is no sign of bacterial infection. I prefer conservative management without antibiotics. Recommend increase p.o. intake of fluids, nonsteroidal, anti-inflammatory drug for the aches and pains and for the congestion. Phenergan DM if symptoms persist, change in character. The patient will notify me immediately.\par \par Chronic pain syndrome. We will continue present medical management. I stop checked. There is no sign of abuse. Patient has been taking medications as prescribed. Has not seen pain management, yet. Detailed discussion with patient regarding the addictive nature of opioids. He is aware but he says in order for him to function and continue his present employment. He needs to have his pain under control. There is no abnormal behavior. There is no doctor shopping. I have been the only physician writing for the patient's pain medications.\par \par The patient has been coming in every 2-3 weeks for pain. Medications I prescribed a full month's supply. Detailed discussion with patient regarding medications to use them sparingly and only as needed and.

## 2020-01-23 NOTE — PHYSICAL EXAM
[No Acute Distress] : no acute distress [Well Nourished] : well nourished [Well-Appearing] : well-appearing [Well Developed] : well developed [Normal Voice/Communication] : normal voice/communication [Normal Sclera/Conjunctiva] : normal sclera/conjunctiva [EOMI] : extraocular movements intact [PERRL] : pupils equal round and reactive to light [Normal Outer Ear/Nose] : the outer ears and nose were normal in appearance [Normal Oropharynx] : the oropharynx was normal [Normal TMs] : both tympanic membranes were normal [No Lymphadenopathy] : no lymphadenopathy [Normal Nasal Mucosa] : the nasal mucosa was normal [No JVD] : no jugular venous distention [Supple] : supple [Thyroid Normal, No Nodules] : the thyroid was normal and there were no nodules present [No Accessory Muscle Use] : no accessory muscle use [No Respiratory Distress] : no respiratory distress  [Regular Rhythm] : with a regular rhythm [Normal Rate] : normal rate  [Clear to Auscultation] : lungs were clear to auscultation bilaterally [No Carotid Bruits] : no carotid bruits [Normal S1, S2] : normal S1 and S2 [No Murmur] : no murmur heard [No Abdominal Bruit] : a ~M bruit was not heard ~T in the abdomen [No Edema] : there was no peripheral edema [Pedal Pulses Present] : the pedal pulses are present [No Varicosities] : no varicosities [No Extremity Clubbing/Cyanosis] : no extremity clubbing/cyanosis [No Palpable Aorta] : no palpable aorta [Non-distended] : non-distended [Soft] : abdomen soft [Non Tender] : non-tender [No Hernias] : no hernias [No Masses] : no abdominal mass palpated [Normal Bowel Sounds] : normal bowel sounds [No HSM] : no HSM [Declined Rectal Exam] : declined rectal exam [Normal Supraclavicular Nodes] : no supraclavicular lymphadenopathy [Normal Axillary Nodes] : no axillary lymphadenopathy [Normal Posterior Cervical Nodes] : no posterior cervical lymphadenopathy [Normal Inguinal Nodes] : no inguinal lymphadenopathy [Normal Femoral Nodes] : no femoral lymphadenopathy [Normal Anterior Cervical Nodes] : no anterior cervical lymphadenopathy [No Spinal Tenderness] : no spinal tenderness [No CVA Tenderness] : no CVA  tenderness [Grossly Normal Strength/Tone] : grossly normal strength/tone [No Rash] : no rash [No Joint Swelling] : no joint swelling [Coordination Grossly Intact] : coordination grossly intact [Normal Gait] : normal gait [No Focal Deficits] : no focal deficits [Speech Grossly Normal] : speech grossly normal [Deep Tendon Reflexes (DTR)] : deep tendon reflexes were 2+ and symmetric [Alert and Oriented x3] : oriented to person, place, and time [Normal Affect] : the affect was normal [Normal Mood] : the mood was normal [Normal Insight/Judgement] : insight and judgment were intact [de-identified] : Positive Tinel sign, carpal tunnel syndrome, and cubital tunnel syndrome [de-identified] : Left shoulder range of motion decreased secondary to pain, status post fall work injury torn tendons

## 2020-02-27 ENCOUNTER — APPOINTMENT (OUTPATIENT)
Dept: FAMILY MEDICINE | Facility: CLINIC | Age: 55
End: 2020-02-27
Payer: COMMERCIAL

## 2020-02-27 VITALS
WEIGHT: 178 LBS | TEMPERATURE: 98.2 F | OXYGEN SATURATION: 96 % | DIASTOLIC BLOOD PRESSURE: 82 MMHG | HEART RATE: 92 BPM | BODY MASS INDEX: 28.61 KG/M2 | HEIGHT: 66 IN | SYSTOLIC BLOOD PRESSURE: 116 MMHG

## 2020-02-27 DIAGNOSIS — R09.89 OTHER SPECIFIED SYMPTOMS AND SIGNS INVOLVING THE CIRCULATORY AND RESPIRATORY SYSTEMS: ICD-10-CM

## 2020-02-27 PROCEDURE — 99213 OFFICE O/P EST LOW 20 MIN: CPT

## 2020-02-27 NOTE — HISTORY OF PRESENT ILLNESS
[FreeTextEntry1] :  see HPI [de-identified] : Patient is a 54-year-old gentleman who presents today for follow-up appointment upper respiratory tract infection and congestion has totally resolved.\par \par Patient presents today for chronic left shoulder pain chronic pain syndrome cubital tunnel syndrome on left patient is on chronic pain medications I am the only physician writing for the pain medications this stems from a work injury numbness and tingling in left arm persists.

## 2020-02-27 NOTE — ASSESSMENT
[FreeTextEntry1] : Assessment and plan:\par \par \par \par Chronic pain syndrome. We will continue present medical management. I stop checked. There is no sign of abuse. Patient has been taking medications as prescribed. Has not seen pain management, yet. Detailed discussion with patient regarding the addictive nature of opioids. He is aware but he says in order for him to function and continue his present employment. He needs to have his pain under control. There is no abnormal behavior. There is no doctor shopping. I have been the only physician writing for the patient's pain medications. I stop checked.\par \par  I prescribed a full month's supply. Detailed discussion with patient regarding medications to use them sparingly and only as needed .

## 2020-02-27 NOTE — PHYSICAL EXAM
[No Acute Distress] : no acute distress [Well Nourished] : well nourished [Well Developed] : well developed [Well-Appearing] : well-appearing [Normal Voice/Communication] : normal voice/communication [Normal Sclera/Conjunctiva] : normal sclera/conjunctiva [PERRL] : pupils equal round and reactive to light [EOMI] : extraocular movements intact [Normal Outer Ear/Nose] : the outer ears and nose were normal in appearance [Normal Oropharynx] : the oropharynx was normal [Normal TMs] : both tympanic membranes were normal [Normal Nasal Mucosa] : the nasal mucosa was normal [No JVD] : no jugular venous distention [No Lymphadenopathy] : no lymphadenopathy [Supple] : supple [Thyroid Normal, No Nodules] : the thyroid was normal and there were no nodules present [No Respiratory Distress] : no respiratory distress  [No Accessory Muscle Use] : no accessory muscle use [Clear to Auscultation] : lungs were clear to auscultation bilaterally [Regular Rhythm] : with a regular rhythm [Normal Rate] : normal rate  [Normal S1, S2] : normal S1 and S2 [No Murmur] : no murmur heard [No Abdominal Bruit] : a ~M bruit was not heard ~T in the abdomen [No Carotid Bruits] : no carotid bruits [Pedal Pulses Present] : the pedal pulses are present [No Varicosities] : no varicosities [No Palpable Aorta] : no palpable aorta [No Edema] : there was no peripheral edema [Soft] : abdomen soft [No Extremity Clubbing/Cyanosis] : no extremity clubbing/cyanosis [Non-distended] : non-distended [Non Tender] : non-tender [No HSM] : no HSM [No Masses] : no abdominal mass palpated [Declined Rectal Exam] : declined rectal exam [No Hernias] : no hernias [Normal Bowel Sounds] : normal bowel sounds [Normal Supraclavicular Nodes] : no supraclavicular lymphadenopathy [Normal Axillary Nodes] : no axillary lymphadenopathy [Normal Anterior Cervical Nodes] : no anterior cervical lymphadenopathy [Normal Posterior Cervical Nodes] : no posterior cervical lymphadenopathy [Normal Inguinal Nodes] : no inguinal lymphadenopathy [No CVA Tenderness] : no CVA  tenderness [Normal Femoral Nodes] : no femoral lymphadenopathy [No Spinal Tenderness] : no spinal tenderness [No Joint Swelling] : no joint swelling [No Rash] : no rash [Grossly Normal Strength/Tone] : grossly normal strength/tone [Coordination Grossly Intact] : coordination grossly intact [Normal Gait] : normal gait [Deep Tendon Reflexes (DTR)] : deep tendon reflexes were 2+ and symmetric [Speech Grossly Normal] : speech grossly normal [No Focal Deficits] : no focal deficits [Normal Affect] : the affect was normal [Alert and Oriented x3] : oriented to person, place, and time [Normal Mood] : the mood was normal [de-identified] : Left shoulder range of motion decreased secondary to pain, status post fall work injury torn tendons [Normal Insight/Judgement] : insight and judgment were intact [de-identified] : Positive Tinel sign, carpal tunnel syndrome, and cubital tunnel syndrome

## 2020-02-27 NOTE — REVIEW OF SYSTEMS
[Joint Pain] : joint pain [Muscle Pain] : no muscle pain [Joint Stiffness] : joint stiffness [Muscle Weakness] : no muscle weakness [Back Pain] : no back pain [Joint Swelling] : no joint swelling [Negative] : Psychiatric [de-identified] : Paresthesia left arm

## 2020-02-27 NOTE — COUNSELING
[Adequate lighting] : Adequate lighting [Fall prevention counseling provided] : Fall prevention counseling provided [No throw rugs] : No throw rugs [Behavioral health counseling provided] : Behavioral health counseling provided [Use proper foot wear] : Use proper foot wear [Sleep ___ hours/day] : Sleep [unfilled] hours/day [Engage in a relaxing activity] : Engage in a relaxing activity [Plan in advance] : Plan in advance [None] : None [Good understanding] : Patient has a good understanding of lifestyle changes and steps needed to achieve self management goal

## 2020-05-01 ENCOUNTER — APPOINTMENT (OUTPATIENT)
Dept: FAMILY MEDICINE | Facility: CLINIC | Age: 55
End: 2020-05-01

## 2020-05-29 ENCOUNTER — APPOINTMENT (OUTPATIENT)
Dept: FAMILY MEDICINE | Facility: CLINIC | Age: 55
End: 2020-05-29
Payer: COMMERCIAL

## 2020-05-29 VITALS
HEART RATE: 94 BPM | DIASTOLIC BLOOD PRESSURE: 82 MMHG | TEMPERATURE: 98 F | WEIGHT: 183 LBS | SYSTOLIC BLOOD PRESSURE: 128 MMHG | OXYGEN SATURATION: 96 % | RESPIRATION RATE: 14 BRPM | BODY MASS INDEX: 29.41 KG/M2 | HEIGHT: 66 IN

## 2020-05-29 DIAGNOSIS — G56.22 LESION OF ULNAR NERVE, LEFT UPPER LIMB: ICD-10-CM

## 2020-05-29 PROCEDURE — 99213 OFFICE O/P EST LOW 20 MIN: CPT | Mod: 25

## 2020-05-29 PROCEDURE — 36415 COLL VENOUS BLD VENIPUNCTURE: CPT

## 2020-05-29 NOTE — COUNSELING
[Behavioral health counseling provided] : Behavioral health counseling provided [Sleep ___ hours/day] : Sleep [unfilled] hours/day [Engage in a relaxing activity] : Engage in a relaxing activity [Plan in advance] : Plan in advance [Good understanding] : Patient has a good understanding of lifestyle changes and steps needed to achieve self management goal [None] : None

## 2020-05-29 NOTE — REVIEW OF SYSTEMS
[Joint Pain] : joint pain [Joint Stiffness] : joint stiffness [Negative] : Heme/Lymph [Muscle Pain] : no muscle pain [Muscle Weakness] : no muscle weakness [Back Pain] : no back pain [Joint Swelling] : no joint swelling [Suicidal] : not suicidal [Insomnia] : insomnia [Anxiety] : anxiety [Depression] : no depression [de-identified] : Paresthesia left arm

## 2020-05-29 NOTE — ASSESSMENT
[FreeTextEntry1] : Assessment and plan:\par \par \par \par Chronic pain syndrome. We will continue present medical management. I stop checked. There is no sign of abuse. Patient has been taking medications as prescribed. Has not seen pain management, yet. Detailed discussion with patient regarding the addictive nature of opioids. He is aware but he says in order for him to function and continue his present employment. He needs to have his pain under control. There is no abnormal behavior. There is no doctor shopping. I have been the only physician writing for the patient's pain medications. I stop checked.\par \par  I prescribed a full month's supply. Detailed discussion with patient regarding medications to use them sparingly and only as needed .  We have changed the dosage of oxycodone to 10 mg twice a day instead of 5 mg 4 times a day.\par \par Anxiety which is situational we will attempt low-dose diazepam 2 mg up to twice a day only as needed for anxiety.

## 2020-05-29 NOTE — HISTORY OF PRESENT ILLNESS
[FreeTextEntry1] :  see HPI [de-identified] : Patient is a 54-year-old gentleman who presents today for follow-up .\par \par Patient presents today for chronic left shoulder pain chronic pain syndrome cubital tunnel syndrome on left patient is on chronic pain medications I am the only physician writing for the pain medications this stems from a work injury numbness and tingling in left arm persists.\par \par Patient complaining of increased anxiety especially during this pandemic at times he is unable to sleep at night just thinking about the possibility of getting the virus COVID-19.  Patient recently had PCR and resulted negative patient is requesting antibody testing which I will do.

## 2020-05-31 LAB
SARS-COV-2 IGG SERPL IA-ACNC: 0.01 INDEX
SARS-COV-2 IGG SERPL QL IA: NEGATIVE

## 2020-06-04 ENCOUNTER — TRANSCRIPTION ENCOUNTER (OUTPATIENT)
Age: 55
End: 2020-06-04

## 2020-07-02 ENCOUNTER — APPOINTMENT (OUTPATIENT)
Dept: FAMILY MEDICINE | Facility: CLINIC | Age: 55
End: 2020-07-02
Payer: COMMERCIAL

## 2020-07-02 VITALS
OXYGEN SATURATION: 98 % | BODY MASS INDEX: 28.78 KG/M2 | HEIGHT: 66 IN | DIASTOLIC BLOOD PRESSURE: 80 MMHG | RESPIRATION RATE: 14 BRPM | SYSTOLIC BLOOD PRESSURE: 124 MMHG | WEIGHT: 179.06 LBS | HEART RATE: 60 BPM | TEMPERATURE: 98.6 F

## 2020-07-02 PROCEDURE — 99213 OFFICE O/P EST LOW 20 MIN: CPT

## 2020-07-02 RX ORDER — PROMETHAZINE HYDROCHLORIDE AND DEXTROMETHORPHAN HYDROBROMIDE ORAL SOLUTION 15; 6.25 MG/5ML; MG/5ML
6.25-15 SOLUTION ORAL
Qty: 240 | Refills: 1 | Status: COMPLETED | COMMUNITY
Start: 2020-01-23 | End: 2020-07-02

## 2020-07-02 NOTE — PHYSICAL EXAM
[Well-Appearing] : well-appearing [Well Nourished] : well nourished [Well Developed] : well developed [No Acute Distress] : no acute distress [Normal Voice/Communication] : normal voice/communication [PERRL] : pupils equal round and reactive to light [Normal Sclera/Conjunctiva] : normal sclera/conjunctiva [EOMI] : extraocular movements intact [Normal Outer Ear/Nose] : the outer ears and nose were normal in appearance [Normal Oropharynx] : the oropharynx was normal [Normal Nasal Mucosa] : the nasal mucosa was normal [Normal TMs] : both tympanic membranes were normal [Supple] : supple [No JVD] : no jugular venous distention [No Lymphadenopathy] : no lymphadenopathy [No Respiratory Distress] : no respiratory distress  [Thyroid Normal, No Nodules] : the thyroid was normal and there were no nodules present [No Accessory Muscle Use] : no accessory muscle use [Normal Rate] : normal rate  [Regular Rhythm] : with a regular rhythm [Clear to Auscultation] : lungs were clear to auscultation bilaterally [Normal S1, S2] : normal S1 and S2 [No Murmur] : no murmur heard [No Carotid Bruits] : no carotid bruits [No Varicosities] : no varicosities [No Abdominal Bruit] : a ~M bruit was not heard ~T in the abdomen [Pedal Pulses Present] : the pedal pulses are present [No Palpable Aorta] : no palpable aorta [No Edema] : there was no peripheral edema [No Extremity Clubbing/Cyanosis] : no extremity clubbing/cyanosis [Non Tender] : non-tender [Non-distended] : non-distended [Soft] : abdomen soft [No Masses] : no abdominal mass palpated [No HSM] : no HSM [Normal Bowel Sounds] : normal bowel sounds [No Hernias] : no hernias [Declined Rectal Exam] : declined rectal exam [Normal Posterior Cervical Nodes] : no posterior cervical lymphadenopathy [Normal Axillary Nodes] : no axillary lymphadenopathy [Normal Supraclavicular Nodes] : no supraclavicular lymphadenopathy [Normal Inguinal Nodes] : no inguinal lymphadenopathy [No CVA Tenderness] : no CVA  tenderness [Normal Femoral Nodes] : no femoral lymphadenopathy [Normal Anterior Cervical Nodes] : no anterior cervical lymphadenopathy [No Spinal Tenderness] : no spinal tenderness [Grossly Normal Strength/Tone] : grossly normal strength/tone [No Joint Swelling] : no joint swelling [No Focal Deficits] : no focal deficits [No Rash] : no rash [Coordination Grossly Intact] : coordination grossly intact [Deep Tendon Reflexes (DTR)] : deep tendon reflexes were 2+ and symmetric [Normal Gait] : normal gait [Speech Grossly Normal] : speech grossly normal [Memory Grossly Normal] : memory grossly normal [Normal Affect] : the affect was normal [Alert and Oriented x3] : oriented to person, place, and time [Normal Insight/Judgement] : insight and judgment were intact [Normal Mood] : the mood was normal [de-identified] : Positive Tinel sign, carpal tunnel syndrome, and cubital tunnel syndrome [de-identified] : Left shoulder range of motion decreased secondary to pain, status post fall work injury torn tendons [de-identified] : Anxiety

## 2020-07-02 NOTE — HISTORY OF PRESENT ILLNESS
[FreeTextEntry1] :  see HPI [de-identified] : Patient is a 54-year-old gentleman who presents today for follow-up .\par \par Patient presents today for chronic left shoulder pain chronic pain syndrome cubital tunnel syndrome on left patient is on chronic pain medications I am the only physician writing for the pain medications this stems from a work injury numbness and tingling in left arm persists.\par \par Patient complaining of increased anxiety especially during this pandemic at times he is unable to sleep at night just thinking about the possibility of getting the virus COVID-19.  Patient recently had PCR and resulted negative patient is requesting antibody testing which I will do.

## 2020-07-02 NOTE — ASSESSMENT
[FreeTextEntry1] : Assessment and plan:\par \par \par \par Chronic pain syndrome. We will continue present medical management. I stop checked. There is no sign of abuse. Patient has been taking medications as prescribed. Has not seen pain management, yet. Detailed discussion with patient regarding the addictive nature of opioids. He is aware but he says in order for him to function and continue his present employment. He needs to have his pain under control. There is no abnormal behavior. There is no doctor shopping. I have been the only physician writing for the patient's pain medications. I stop checked patient referred to orthopedic surgery for evaluation and also for intra-articular joint injection hopefully we will be able to avoid surgery.\par \par Anxiety which is situational we will attempt low-dose diazepam 2 mg up to twice a day only as needed for anxiety.

## 2020-07-02 NOTE — REVIEW OF SYSTEMS
[Joint Pain] : joint pain [Joint Stiffness] : joint stiffness [Muscle Pain] : no muscle pain [Joint Swelling] : no joint swelling [Muscle Weakness] : no muscle weakness [Back Pain] : no back pain [Suicidal] : not suicidal [Depression] : no depression [Anxiety] : anxiety [Insomnia] : insomnia [Negative] : Neurological [de-identified] : Paresthesia left arm

## 2020-07-13 ENCOUNTER — APPOINTMENT (OUTPATIENT)
Dept: ORTHOPEDIC SURGERY | Facility: CLINIC | Age: 55
End: 2020-07-13
Payer: COMMERCIAL

## 2020-07-16 ENCOUNTER — APPOINTMENT (OUTPATIENT)
Dept: ORTHOPEDIC SURGERY | Facility: CLINIC | Age: 55
End: 2020-07-16
Payer: COMMERCIAL

## 2020-07-16 VITALS
HEART RATE: 77 BPM | DIASTOLIC BLOOD PRESSURE: 78 MMHG | WEIGHT: 175 LBS | BODY MASS INDEX: 28.12 KG/M2 | TEMPERATURE: 96.5 F | SYSTOLIC BLOOD PRESSURE: 135 MMHG | HEIGHT: 66 IN

## 2020-07-16 PROCEDURE — 99203 OFFICE O/P NEW LOW 30 MIN: CPT | Mod: 25

## 2020-07-16 PROCEDURE — 20610 DRAIN/INJ JOINT/BURSA W/O US: CPT | Mod: LT

## 2020-07-16 PROCEDURE — 73030 X-RAY EXAM OF SHOULDER: CPT | Mod: LT

## 2020-07-16 NOTE — DISCUSSION/SUMMARY
[de-identified] : The underlying pathophysiology was reviewed in great detail with the patient as well as the various treatment options, including ice, analgesics, NSAIDs, Physical therapy, steroid injections, hyaluronic gel injections, surgical intervention. \par \par Patient received a corticosteroid injection of the left shoulder subacromial space today. \par \par Activity modifications and restrictions were discussed. I advised avoiding overhead lifting. I advised the patient to work on good posture.\par \par FU 6-8 weeks or prn\par \par All questions were answered, all alternatives discussed and the patient is in complete agreement with that plan. Follow-up appointment as instructed. Any issues and the patient will call or come in sooner.\par

## 2020-07-16 NOTE — PHYSICAL EXAM
[de-identified] : Left Upper Extremity\par o Shoulder :\par ¦ Inspection/Palpation : tenderness to palpation greater tuberosity and posterior glenohumeral joint, no swelling, no deformities\par ¦ Range of Motion : ACTIVE FORWARD ELEVATION: Measured at 140 degrees, ACTIVE EXTERNAL ROTATION: Measured at 50 degrees, ACTIVE INTERNAL ROTATION: Measured at PSIS\par ¦ Strength : external rotation 5-/5, internal rotation 5-/5, supraspinatus 5-/5, all with pain\par ¦ Stability : no joint instability on provocative testing\par ¦ Tests/Signs : Neer (-), Meyer (+)\par o Upper Arm : no tenderness, no swelling, no deformities\par o Muscle Bulk : no atrophy\par o Sensation : sensation intact to light touch\par o Skin : no skin rash or discoloration\par o Vascular Exam : no edema, no cyanosis, radial and ulnar pulses normal [de-identified] : o Left Shoulder : Grashey, Axillary and Outlet views were obtained, there are no soft tissue abnormalities, no fractures, alignment is normal, moderate to advanced glenohumeral osteoarthritis. , normal bone density, no bony lesions.\par \par o MRI Arthrogram of the left shoulder performed on 11/26/2018 at [facility] : Impression:\par ¦ Near circumferential nondisplaced labrum tear with contrast imbibition.\par ¦ Moderate- severe glenohumeral and moderate acromioclavicular joint osteoarthritis.Mild subacromial- subdeltoid bursitis. \par ¦ Mild supraspinatus and mild- moderate infraspinatus tendinosis with low-grade partial tearing/fraying. No full thickness rotator cuff tear.

## 2020-07-16 NOTE — HISTORY OF PRESENT ILLNESS
[de-identified] : NICOLE HDEZ is a 54 year old RHD male presenting to the office complaining of left shoulder pain. Patient reports pain since 10/2018 when he was carrying a large water cooler up the stairs at which time he slipped and fell with injury to the left shoulder. He was under the care of Dr. Winston who ordered an MRI of the left shoulder diagnosing him with  Near circumferential nondisplaced labrum tear with contrast imbibition, Moderate- severe glenohumeral and moderate acromioclavicular joint osteoarthritis.Mild subacromial- subdeltoid bursitis and Mild supraspinatus and mild- moderate infraspinatus tendinosis with low-grade partial tearing/fraying. No full thickness rotator cuff tear. \par  The patient describes the pain as a dull aching, and occasionally sharp pain localized to the anterior aspect of his left shoulder that is intermittent in nature. His  symptoms are exacerbated with any movement of the shoulder especially IR. Patient reports the pain is waking him up at night.  Patient reports associated weakness. Denies numbness and tingling in the upper extremity. Patient is taking Oxycodone prescribed by his PCP Dr. Gibson for pain relief with moderate relief in symptoms. He has had two corticosteroid injections previously in his left shoulder. His last injection was approximately one year ago and gave him seven months of relief. Of note patient was previously diagnosed with a 30% loss of use of the left shoulder.Patient denies any other complaints at this time.

## 2020-07-16 NOTE — PROCEDURE
[de-identified] : At this point I recommended a therapeutic injection and under sterile precautions an injection of 4 cc 1% lidocaine with 0.5 cc of Kenalog and 0.5 cc of Dexamethasone- was placed into the subacromial space of the Left shoulder without complication, and after several minutes, the patient felt significant relief.\par \par \par \par

## 2020-08-14 ENCOUNTER — APPOINTMENT (OUTPATIENT)
Dept: FAMILY MEDICINE | Facility: CLINIC | Age: 55
End: 2020-08-14
Payer: COMMERCIAL

## 2020-08-14 VITALS
HEART RATE: 91 BPM | TEMPERATURE: 98.4 F | HEIGHT: 66 IN | WEIGHT: 182 LBS | OXYGEN SATURATION: 98 % | DIASTOLIC BLOOD PRESSURE: 75 MMHG | SYSTOLIC BLOOD PRESSURE: 109 MMHG | BODY MASS INDEX: 29.25 KG/M2

## 2020-08-14 DIAGNOSIS — M75.52 BURSITIS OF LEFT SHOULDER: ICD-10-CM

## 2020-08-14 PROCEDURE — 99213 OFFICE O/P EST LOW 20 MIN: CPT

## 2020-08-14 NOTE — COUNSELING
[Behavioral health counseling provided] : Behavioral health counseling provided [Plan in advance] : Plan in advance [Sleep ___ hours/day] : Sleep [unfilled] hours/day [Engage in a relaxing activity] : Engage in a relaxing activity [AUDIT-C Screening administered and reviewed] : AUDIT-C Screening administered and reviewed [None] : None [Good understanding] : Patient has a good understanding of lifestyle changes and steps needed to achieve self management goal

## 2020-08-14 NOTE — REVIEW OF SYSTEMS
[Joint Pain] : joint pain [Joint Stiffness] : joint stiffness [Muscle Pain] : no muscle pain [Joint Swelling] : no joint swelling [Muscle Weakness] : no muscle weakness [Back Pain] : no back pain [Suicidal] : not suicidal [Anxiety] : anxiety [Insomnia] : insomnia [Depression] : no depression [Negative] : Heme/Lymph [de-identified] : Paresthesia left arm

## 2020-08-14 NOTE — HISTORY OF PRESENT ILLNESS
[FreeTextEntry1] :  see HPI [de-identified] : Patient is a 54-year-old gentleman who presents today for follow-up .\par \par Patient presents today for chronic left shoulder pain chronic pain syndrome cubital tunnel syndrome on left patient is on chronic pain medications I am the only physician writing for the pain medications this stems from a work injury numbness and tingling in left arm persists.\par \par Patient complaining of increased anxiety especially during this pandemic at times he is unable to sleep at night just thinking about the possibility of getting the virus COVID-19.  Patient recently had PCR and resulted negative patient is requesting antibody testing which I will do.

## 2020-08-14 NOTE — HEALTH RISK ASSESSMENT
[Yes] : Yes [] : No [2 - 4 times a month (2 pts)] : 2-4 times a month (2 points) [1 or 2 (0 pts)] : 1 or 2 (0 points) [Never (0 pts)] : Never (0 points) [No] : In the past 12 months have you used drugs other than those required for medical reasons? No [No falls in past year] : Patient reported no falls in the past year [0] : 2) Feeling down, depressed, or hopeless: Not at all (0) [[id number]] : 0 [Audit-CScore] : 2

## 2020-08-14 NOTE — ASSESSMENT
[FreeTextEntry1] : Assessment and plan:\par \par Patient recently seen by orthopedic surgery and had trigger point injection done patient states that the pain did not improve with the trigger point injection.\par \par \par \par Chronic pain syndrome. We will continue present medical management. I stop checked. There is no sign of abuse. Patient has been taking medications as prescribed. Has not seen pain management, yet. Detailed discussion with patient regarding the addictive nature of opioids. He is aware but he says in order for him to function and continue his present employment. He needs to have his pain under control. There is no abnormal behavior. There is no doctor shopping. I have been the only physician writing for the patient's pain medications. I stop checked patient referred to orthopedic surgery for evaluation and also for intra-articular joint injection hopefully we will be able to avoid surgery.\par \par Anxiety which is situational we will attempt low-dose diazepam 2 mg up to twice a day only as needed for anxiety.

## 2020-08-14 NOTE — PHYSICAL EXAM
[No Acute Distress] : no acute distress [Well Developed] : well developed [Well Nourished] : well nourished [Well-Appearing] : well-appearing [Normal Voice/Communication] : normal voice/communication [Normal Sclera/Conjunctiva] : normal sclera/conjunctiva [PERRL] : pupils equal round and reactive to light [EOMI] : extraocular movements intact [Normal Outer Ear/Nose] : the outer ears and nose were normal in appearance [Normal Nasal Mucosa] : the nasal mucosa was normal [Normal Oropharynx] : the oropharynx was normal [Normal TMs] : both tympanic membranes were normal [No Lymphadenopathy] : no lymphadenopathy [Supple] : supple [No JVD] : no jugular venous distention [No Accessory Muscle Use] : no accessory muscle use [Thyroid Normal, No Nodules] : the thyroid was normal and there were no nodules present [No Respiratory Distress] : no respiratory distress  [Normal Rate] : normal rate  [Regular Rhythm] : with a regular rhythm [Clear to Auscultation] : lungs were clear to auscultation bilaterally [Normal S1, S2] : normal S1 and S2 [No Murmur] : no murmur heard [No Varicosities] : no varicosities [No Abdominal Bruit] : a ~M bruit was not heard ~T in the abdomen [No Carotid Bruits] : no carotid bruits [No Palpable Aorta] : no palpable aorta [Pedal Pulses Present] : the pedal pulses are present [No Edema] : there was no peripheral edema [Non Tender] : non-tender [No Extremity Clubbing/Cyanosis] : no extremity clubbing/cyanosis [Soft] : abdomen soft [No Masses] : no abdominal mass palpated [Non-distended] : non-distended [No HSM] : no HSM [Normal Bowel Sounds] : normal bowel sounds [No Hernias] : no hernias [Declined Rectal Exam] : declined rectal exam [Normal Supraclavicular Nodes] : no supraclavicular lymphadenopathy [Normal Axillary Nodes] : no axillary lymphadenopathy [Normal Posterior Cervical Nodes] : no posterior cervical lymphadenopathy [Normal Anterior Cervical Nodes] : no anterior cervical lymphadenopathy [Normal Inguinal Nodes] : no inguinal lymphadenopathy [No CVA Tenderness] : no CVA  tenderness [Normal Femoral Nodes] : no femoral lymphadenopathy [No Rash] : no rash [No Spinal Tenderness] : no spinal tenderness [Grossly Normal Strength/Tone] : grossly normal strength/tone [No Joint Swelling] : no joint swelling [No Focal Deficits] : no focal deficits [Coordination Grossly Intact] : coordination grossly intact [Normal Gait] : normal gait [Memory Grossly Normal] : memory grossly normal [Deep Tendon Reflexes (DTR)] : deep tendon reflexes were 2+ and symmetric [Speech Grossly Normal] : speech grossly normal [Alert and Oriented x3] : oriented to person, place, and time [Normal Affect] : the affect was normal [Normal Mood] : the mood was normal [Normal Insight/Judgement] : insight and judgment were intact [de-identified] : Positive Tinel sign, carpal tunnel syndrome, and cubital tunnel syndrome [de-identified] : Anxiety [de-identified] : Left shoulder range of motion decreased secondary to pain, status post fall work injury torn tendons

## 2020-10-09 ENCOUNTER — APPOINTMENT (OUTPATIENT)
Dept: FAMILY MEDICINE | Facility: CLINIC | Age: 55
End: 2020-10-09
Payer: COMMERCIAL

## 2020-10-09 VITALS
DIASTOLIC BLOOD PRESSURE: 88 MMHG | TEMPERATURE: 98.1 F | WEIGHT: 181 LBS | HEIGHT: 66 IN | SYSTOLIC BLOOD PRESSURE: 133 MMHG | RESPIRATION RATE: 14 BRPM | BODY MASS INDEX: 29.09 KG/M2 | HEART RATE: 71 BPM | OXYGEN SATURATION: 99 %

## 2020-10-09 PROCEDURE — 99213 OFFICE O/P EST LOW 20 MIN: CPT | Mod: 25

## 2020-10-09 PROCEDURE — G0008: CPT

## 2020-10-09 PROCEDURE — 90686 IIV4 VACC NO PRSV 0.5 ML IM: CPT

## 2020-10-09 NOTE — HEALTH RISK ASSESSMENT
[Yes] : Yes [2 - 4 times a month (2 pts)] : 2-4 times a month (2 points) [1 or 2 (0 pts)] : 1 or 2 (0 points) [Never (0 pts)] : Never (0 points) [No] : In the past 12 months have you used drugs other than those required for medical reasons? No [No falls in past year] : Patient reported no falls in the past year [0] : 2) Feeling down, depressed, or hopeless: Not at all (0) [] : No [YearQuit] : 2012 [Audit-CScore] : 2 [[id number]] : 0

## 2020-10-09 NOTE — PHYSICAL EXAM
[No Acute Distress] : no acute distress [Well Nourished] : well nourished [Well Developed] : well developed [Well-Appearing] : well-appearing [Normal Voice/Communication] : normal voice/communication [Normal Sclera/Conjunctiva] : normal sclera/conjunctiva [PERRL] : pupils equal round and reactive to light [EOMI] : extraocular movements intact [Normal Outer Ear/Nose] : the outer ears and nose were normal in appearance [Normal Oropharynx] : the oropharynx was normal [Normal TMs] : both tympanic membranes were normal [Normal Nasal Mucosa] : the nasal mucosa was normal [No JVD] : no jugular venous distention [No Lymphadenopathy] : no lymphadenopathy [Supple] : supple [Thyroid Normal, No Nodules] : the thyroid was normal and there were no nodules present [No Respiratory Distress] : no respiratory distress  [No Accessory Muscle Use] : no accessory muscle use [Clear to Auscultation] : lungs were clear to auscultation bilaterally [Normal Rate] : normal rate  [Regular Rhythm] : with a regular rhythm [Normal S1, S2] : normal S1 and S2 [No Murmur] : no murmur heard [No Carotid Bruits] : no carotid bruits [No Abdominal Bruit] : a ~M bruit was not heard ~T in the abdomen [No Varicosities] : no varicosities [Pedal Pulses Present] : the pedal pulses are present [No Edema] : there was no peripheral edema [No Palpable Aorta] : no palpable aorta [No Extremity Clubbing/Cyanosis] : no extremity clubbing/cyanosis [Soft] : abdomen soft [Non Tender] : non-tender [Non-distended] : non-distended [No Masses] : no abdominal mass palpated [No HSM] : no HSM [Normal Bowel Sounds] : normal bowel sounds [No Hernias] : no hernias [Declined Rectal Exam] : declined rectal exam [Normal Supraclavicular Nodes] : no supraclavicular lymphadenopathy [Normal Axillary Nodes] : no axillary lymphadenopathy [Normal Posterior Cervical Nodes] : no posterior cervical lymphadenopathy [Normal Anterior Cervical Nodes] : no anterior cervical lymphadenopathy [Normal Inguinal Nodes] : no inguinal lymphadenopathy [Normal Femoral Nodes] : no femoral lymphadenopathy [No CVA Tenderness] : no CVA  tenderness [No Spinal Tenderness] : no spinal tenderness [No Joint Swelling] : no joint swelling [Grossly Normal Strength/Tone] : grossly normal strength/tone [No Rash] : no rash [Coordination Grossly Intact] : coordination grossly intact [No Focal Deficits] : no focal deficits [Normal Gait] : normal gait [Deep Tendon Reflexes (DTR)] : deep tendon reflexes were 2+ and symmetric [Speech Grossly Normal] : speech grossly normal [Memory Grossly Normal] : memory grossly normal [Normal Affect] : the affect was normal [Alert and Oriented x3] : oriented to person, place, and time [Normal Mood] : the mood was normal [Normal Insight/Judgement] : insight and judgment were intact [de-identified] : Left shoulder range of motion decreased secondary to pain, status post fall work injury torn tendons [de-identified] : Positive Tinel sign, carpal tunnel syndrome, and cubital tunnel syndrome [de-identified] : Anxiety

## 2020-10-09 NOTE — ASSESSMENT
[FreeTextEntry1] : Assessment and plan:\par Chronic pain syndrome. We will continue present medical management. I stop checked. There is no sign of abuse. Patient has been taking medications as prescribed. Has not seen pain management, yet. Detailed discussion with patient regarding the addictive nature of opioids. He is aware but he says in order for him to function and continue his present employment. He needs to have his pain under control. There is no abnormal behavior. There is no doctor shopping. I have been the only physician writing for the patient's pain medications. I stop checked patient referred to orthopedic surgery for evaluation and also for intra-articular joint injection hopefully we will be able to avoid surgery.\par \par Influenza vaccine administered at this visit.

## 2020-10-09 NOTE — HISTORY OF PRESENT ILLNESS
[FreeTextEntry1] :  see HPI [de-identified] : Patient is a 55-year-old gentleman who presents today for follow-up .\par \par Patient presents today for chronic left shoulder pain chronic pain syndrome cubital tunnel syndrome on left patient is on chronic pain medications I am the only physician writing for the pain medications this stems from a work injury numbness and tingling in left arm persists.\par \par Anxiety well controlled in fact patient no longer taking medications.  At this visit influenza vaccine will be administered.

## 2020-10-09 NOTE — REVIEW OF SYSTEMS
[Joint Pain] : joint pain [Joint Stiffness] : joint stiffness [Insomnia] : insomnia [Anxiety] : anxiety [Negative] : Heme/Lymph [Muscle Pain] : no muscle pain [Muscle Weakness] : no muscle weakness [Back Pain] : no back pain [Joint Swelling] : no joint swelling [Suicidal] : not suicidal [Depression] : no depression [de-identified] : Paresthesia left arm

## 2020-11-13 ENCOUNTER — APPOINTMENT (OUTPATIENT)
Dept: FAMILY MEDICINE | Facility: CLINIC | Age: 55
End: 2020-11-13
Payer: COMMERCIAL

## 2020-11-13 VITALS
RESPIRATION RATE: 14 BRPM | OXYGEN SATURATION: 98 % | DIASTOLIC BLOOD PRESSURE: 80 MMHG | HEIGHT: 66 IN | TEMPERATURE: 98 F | SYSTOLIC BLOOD PRESSURE: 121 MMHG | WEIGHT: 178 LBS | HEART RATE: 66 BPM | BODY MASS INDEX: 28.61 KG/M2

## 2020-11-13 PROCEDURE — 99214 OFFICE O/P EST MOD 30 MIN: CPT

## 2020-11-13 PROCEDURE — 99072 ADDL SUPL MATRL&STAF TM PHE: CPT

## 2020-11-13 RX ORDER — DIAZEPAM 2 MG/1
2 TABLET ORAL TWICE DAILY
Qty: 60 | Refills: 0 | Status: COMPLETED | COMMUNITY
Start: 2020-05-29 | End: 2020-11-13

## 2020-11-13 NOTE — ASSESSMENT
[FreeTextEntry1] : Assessment and plan:\par \par 1.  Generalized anxiety disorder/fatigue/feeling blue patient denies full-blown depression but he may be an excellent candidate for bupropion.  Detailed discussion with patient regarding the medication we will attempt bupropion 150 mg XL once a day we will see how he does patient will call me if he develops any side effects or is unable to tolerate the medications.  He will keep his usual follow-up appointment.\par \par 2.  Chronic pain syndrome patient has been taking oxycodone 10 mg twice a day as needed patient has been taking medications as prescribed I stop checked there is no sign of abuse continue present medical management.  Patient utilizes medications appropriately for pain relief so he can perform his usual duties at work.\par \par

## 2020-11-13 NOTE — COUNSELING
[Fall prevention counseling provided] : Fall prevention counseling provided [Adequate lighting] : Adequate lighting [No throw rugs] : No throw rugs [Use proper foot wear] : Use proper foot wear [Behavioral health counseling provided] : Behavioral health counseling provided [Sleep ___ hours/day] : Sleep [unfilled] hours/day [Engage in a relaxing activity] : Engage in a relaxing activity [Plan in advance] : Plan in advance [None] : None [Good understanding] : Patient has a good understanding of lifestyle changes and steps needed to achieve self management goal

## 2020-11-13 NOTE — PHYSICAL EXAM
[No Acute Distress] : no acute distress [Well Nourished] : well nourished [Well Developed] : well developed [Well-Appearing] : well-appearing [Normal Voice/Communication] : normal voice/communication [Normal Sclera/Conjunctiva] : normal sclera/conjunctiva [PERRL] : pupils equal round and reactive to light [EOMI] : extraocular movements intact [Normal Outer Ear/Nose] : the outer ears and nose were normal in appearance [Normal Oropharynx] : the oropharynx was normal [Normal TMs] : both tympanic membranes were normal [Normal Nasal Mucosa] : the nasal mucosa was normal [No JVD] : no jugular venous distention [No Lymphadenopathy] : no lymphadenopathy [Supple] : supple [Thyroid Normal, No Nodules] : the thyroid was normal and there were no nodules present [No Respiratory Distress] : no respiratory distress  [No Accessory Muscle Use] : no accessory muscle use [Clear to Auscultation] : lungs were clear to auscultation bilaterally [Normal Rate] : normal rate  [Regular Rhythm] : with a regular rhythm [Normal S1, S2] : normal S1 and S2 [No Murmur] : no murmur heard [No Carotid Bruits] : no carotid bruits [No Abdominal Bruit] : a ~M bruit was not heard ~T in the abdomen [No Varicosities] : no varicosities [Pedal Pulses Present] : the pedal pulses are present [No Edema] : there was no peripheral edema [No Palpable Aorta] : no palpable aorta [No Extremity Clubbing/Cyanosis] : no extremity clubbing/cyanosis [Soft] : abdomen soft [Non Tender] : non-tender [Non-distended] : non-distended [No Masses] : no abdominal mass palpated [No HSM] : no HSM [Normal Bowel Sounds] : normal bowel sounds [No Hernias] : no hernias [Declined Rectal Exam] : declined rectal exam [Normal Supraclavicular Nodes] : no supraclavicular lymphadenopathy [Normal Axillary Nodes] : no axillary lymphadenopathy [Normal Posterior Cervical Nodes] : no posterior cervical lymphadenopathy [Normal Anterior Cervical Nodes] : no anterior cervical lymphadenopathy [Normal Inguinal Nodes] : no inguinal lymphadenopathy [Normal Femoral Nodes] : no femoral lymphadenopathy [No CVA Tenderness] : no CVA  tenderness [No Spinal Tenderness] : no spinal tenderness [No Joint Swelling] : no joint swelling [Grossly Normal Strength/Tone] : grossly normal strength/tone [No Rash] : no rash [Coordination Grossly Intact] : coordination grossly intact [No Focal Deficits] : no focal deficits [Normal Gait] : normal gait [Deep Tendon Reflexes (DTR)] : deep tendon reflexes were 2+ and symmetric [Speech Grossly Normal] : speech grossly normal [Memory Grossly Normal] : memory grossly normal [Normal Affect] : the affect was normal [Alert and Oriented x3] : oriented to person, place, and time [Normal Mood] : the mood was normal [Normal Insight/Judgement] : insight and judgment were intact [de-identified] : Left shoulder range of motion decreased secondary to pain, status post fall work injury torn tendons [de-identified] : Positive Tinel sign, carpal tunnel syndrome, and cubital tunnel syndrome [de-identified] : Anxiety

## 2020-11-13 NOTE — REVIEW OF SYSTEMS
[Joint Pain] : joint pain [Joint Stiffness] : joint stiffness [Insomnia] : insomnia [Anxiety] : anxiety [Negative] : Heme/Lymph [Muscle Pain] : no muscle pain [Muscle Weakness] : no muscle weakness [Back Pain] : no back pain [Joint Swelling] : no joint swelling [Suicidal] : not suicidal [Depression] : no depression [de-identified] : Paresthesia left arm

## 2020-11-13 NOTE — HISTORY OF PRESENT ILLNESS
[FreeTextEntry1] : See HPI [de-identified] : Is a 55-year-old gentleman who presents today for follow-up and disease management.  We will be addressing chronic pain syndrome, left shoulder pain with left upper extremity paresthesia and generalized anxiety disorder.\par \par Patient states that his generalized anxiety disorder is feeling quite different and he would prefer not taking Valium any longer he said that he describes it as feeling blue not totally depressed with underlying anxiety.

## 2020-12-21 ENCOUNTER — NON-APPOINTMENT (OUTPATIENT)
Age: 55
End: 2020-12-21

## 2021-02-09 ENCOUNTER — APPOINTMENT (OUTPATIENT)
Dept: FAMILY MEDICINE | Facility: CLINIC | Age: 56
End: 2021-02-09
Payer: COMMERCIAL

## 2021-02-09 VITALS
TEMPERATURE: 97.4 F | WEIGHT: 183 LBS | DIASTOLIC BLOOD PRESSURE: 80 MMHG | RESPIRATION RATE: 16 BRPM | HEART RATE: 93 BPM | HEIGHT: 68 IN | OXYGEN SATURATION: 96 % | SYSTOLIC BLOOD PRESSURE: 120 MMHG | BODY MASS INDEX: 27.74 KG/M2

## 2021-02-09 PROCEDURE — 99072 ADDL SUPL MATRL&STAF TM PHE: CPT

## 2021-02-09 PROCEDURE — 99213 OFFICE O/P EST LOW 20 MIN: CPT

## 2021-02-09 NOTE — ASSESSMENT
[FreeTextEntry1] : Assessment and plan:\par \par 1.  Generalized anxiety disorder/fatigue/feeling blue patient denies full-blown depression but he may be an excellent candidate for bupropion.  Detailed discussion with patient regarding the medication we will attempt bupropion 150 mg XL once a day we will see how he does patient will call me if he develops any side effects or is unable to tolerate the medications.  He will keep his usual follow-up appointment.\par \par 2.  Chronic pain syndrome patient has been taking oxycodone 10 mg twice a day as needed patient has been taking medications as prescribed I stop checked there is no sign of abuse continue present medical management.  Patient utilizes medications appropriately for pain relief so he can perform his usual duties at work.\par \par 3.  Status post work accident patient was accidentally hit by a dumpster involving the left side of his body especially his shoulder he will be following up with orthopedic surgery and physical therapy.  Patient will be returning back to work this coming Tuesday, 2/16/2021.\par \par

## 2021-02-09 NOTE — PHYSICAL EXAM
[Well Nourished] : well nourished [Well Developed] : well developed [Well-Appearing] : well-appearing [Normal Voice/Communication] : normal voice/communication [Normal Sclera/Conjunctiva] : normal sclera/conjunctiva [PERRL] : pupils equal round and reactive to light [EOMI] : extraocular movements intact [Normal Outer Ear/Nose] : the outer ears and nose were normal in appearance [Normal Oropharynx] : the oropharynx was normal [Normal TMs] : both tympanic membranes were normal [Normal Nasal Mucosa] : the nasal mucosa was normal [No JVD] : no jugular venous distention [Supple] : supple [No Lymphadenopathy] : no lymphadenopathy [Thyroid Normal, No Nodules] : the thyroid was normal and there were no nodules present [No Respiratory Distress] : no respiratory distress  [No Accessory Muscle Use] : no accessory muscle use [Clear to Auscultation] : lungs were clear to auscultation bilaterally [Normal Rate] : normal rate  [Regular Rhythm] : with a regular rhythm [Normal S1, S2] : normal S1 and S2 [No Murmur] : no murmur heard [No Carotid Bruits] : no carotid bruits [No Abdominal Bruit] : a ~M bruit was not heard ~T in the abdomen [No Varicosities] : no varicosities [Pedal Pulses Present] : the pedal pulses are present [No Edema] : there was no peripheral edema [No Palpable Aorta] : no palpable aorta [No Extremity Clubbing/Cyanosis] : no extremity clubbing/cyanosis [Soft] : abdomen soft [Non Tender] : non-tender [Non-distended] : non-distended [No Masses] : no abdominal mass palpated [No HSM] : no HSM [Normal Bowel Sounds] : normal bowel sounds [No Hernias] : no hernias [Declined Rectal Exam] : declined rectal exam [Normal Supraclavicular Nodes] : no supraclavicular lymphadenopathy [Normal Axillary Nodes] : no axillary lymphadenopathy [Normal Posterior Cervical Nodes] : no posterior cervical lymphadenopathy [Normal Inguinal Nodes] : no inguinal lymphadenopathy [Normal Anterior Cervical Nodes] : no anterior cervical lymphadenopathy [Normal Femoral Nodes] : no femoral lymphadenopathy [No CVA Tenderness] : no CVA  tenderness [No Spinal Tenderness] : no spinal tenderness [No Joint Swelling] : no joint swelling [Grossly Normal Strength/Tone] : grossly normal strength/tone [No Rash] : no rash [Coordination Grossly Intact] : coordination grossly intact [No Focal Deficits] : no focal deficits [Normal Gait] : normal gait [Speech Grossly Normal] : speech grossly normal [Deep Tendon Reflexes (DTR)] : deep tendon reflexes were 2+ and symmetric [Memory Grossly Normal] : memory grossly normal [Normal Affect] : the affect was normal [Alert and Oriented x3] : oriented to person, place, and time [Normal Mood] : the mood was normal [Normal Insight/Judgement] : insight and judgment were intact [de-identified] : Left shoulder range of motion decreased secondary to pain, status post fall work injury torn tendons [de-identified] : Positive Tinel sign, carpal tunnel syndrome, and cubital tunnel syndrome [de-identified] : Anxiety

## 2021-02-09 NOTE — REVIEW OF SYSTEMS
[Joint Pain] : joint pain [Joint Stiffness] : joint stiffness [Insomnia] : insomnia [Anxiety] : anxiety [Negative] : Heme/Lymph [Muscle Pain] : no muscle pain [Muscle Weakness] : no muscle weakness [Back Pain] : no back pain [Joint Swelling] : no joint swelling [Suicidal] : not suicidal [Depression] : no depression [de-identified] : Paresthesia left arm [de-identified] : improving

## 2021-02-09 NOTE — HISTORY OF PRESENT ILLNESS
[FreeTextEntry1] : See HPI [de-identified] : Patient is a 55-year-old gentleman who presents today for follow-up and disease management.  Patient states that he is in his usual state of health he does admit to improvement with his anxiety with Wellbutrin in fact it even curbed his appetite for tobacco.  We may need to increase Wellbutrin but at this point we will continue present dose.\par \par Patient's medical history is significant for generalized anxiety, chronic pain syndrome status post work accident unfortunately the accident again occurred involving his left shoulder he is being seen by orthopedic surgery and is doing very well with physical therapy.  He still has residual numbness and tingling in left arm and he does have underlying primary osteoarthritis of left shoulder.  We will continue conservative management with physical therapy and medications and we will attempt to avoid surgery.

## 2021-03-25 ENCOUNTER — APPOINTMENT (OUTPATIENT)
Dept: FAMILY MEDICINE | Facility: CLINIC | Age: 56
End: 2021-03-25
Payer: COMMERCIAL

## 2021-03-25 VITALS
BODY MASS INDEX: 26.98 KG/M2 | HEIGHT: 68 IN | SYSTOLIC BLOOD PRESSURE: 131 MMHG | DIASTOLIC BLOOD PRESSURE: 83 MMHG | RESPIRATION RATE: 16 BRPM | HEART RATE: 76 BPM | OXYGEN SATURATION: 97 % | WEIGHT: 178 LBS | TEMPERATURE: 97.7 F

## 2021-03-25 DIAGNOSIS — G56.02 CARPAL TUNNEL SYNDROME, LEFT UPPER LIMB: ICD-10-CM

## 2021-03-25 DIAGNOSIS — M19.012 PRIMARY OSTEOARTHRITIS, LEFT SHOULDER: ICD-10-CM

## 2021-03-25 PROCEDURE — 99214 OFFICE O/P EST MOD 30 MIN: CPT

## 2021-03-25 PROCEDURE — 99072 ADDL SUPL MATRL&STAF TM PHE: CPT

## 2021-03-25 NOTE — REVIEW OF SYSTEMS
[Joint Pain] : joint pain [Joint Stiffness] : joint stiffness [Muscle Pain] : no muscle pain [Muscle Weakness] : no muscle weakness [Back Pain] : no back pain [Joint Swelling] : no joint swelling [Suicidal] : not suicidal [Insomnia] : insomnia [Anxiety] : anxiety [Depression] : no depression [Negative] : Heme/Lymph [de-identified] : Paresthesia left arm [de-identified] : improving

## 2021-03-25 NOTE — HISTORY OF PRESENT ILLNESS
[FreeTextEntry1] : See HPI [de-identified] : Patient is a 55-year-old gentleman who presents today for follow-up and disease management.  Patient states that he is in his usual state of health he does admit to improvement with his anxiety with Wellbutrin in fact it even curbed his appetite for tobacco.  We may need to increase Wellbutrin but at this point we will continue present dose.\par \par Patient's medical history is significant for generalized anxiety, chronic pain syndrome status post work accident unfortunately the accident again occurred involving his left shoulder he is being seen by orthopedic surgery and is doing very well with physical therapy.  He still has residual numbness and tingling in left arm and he does have underlying primary osteoarthritis of left shoulder.  We will continue conservative management.

## 2021-03-25 NOTE — PHYSICAL EXAM
[Well Nourished] : well nourished [Well Developed] : well developed [Well-Appearing] : well-appearing [Normal Voice/Communication] : normal voice/communication [Normal Sclera/Conjunctiva] : normal sclera/conjunctiva [PERRL] : pupils equal round and reactive to light [EOMI] : extraocular movements intact [Normal Outer Ear/Nose] : the outer ears and nose were normal in appearance [Normal Oropharynx] : the oropharynx was normal [Normal TMs] : both tympanic membranes were normal [Normal Nasal Mucosa] : the nasal mucosa was normal [No JVD] : no jugular venous distention [No Lymphadenopathy] : no lymphadenopathy [Supple] : supple [Thyroid Normal, No Nodules] : the thyroid was normal and there were no nodules present [No Respiratory Distress] : no respiratory distress  [No Accessory Muscle Use] : no accessory muscle use [Clear to Auscultation] : lungs were clear to auscultation bilaterally [Normal Rate] : normal rate  [Regular Rhythm] : with a regular rhythm [Normal S1, S2] : normal S1 and S2 [No Murmur] : no murmur heard [No Carotid Bruits] : no carotid bruits [No Abdominal Bruit] : a ~M bruit was not heard ~T in the abdomen [No Varicosities] : no varicosities [Pedal Pulses Present] : the pedal pulses are present [No Edema] : there was no peripheral edema [No Palpable Aorta] : no palpable aorta [No Extremity Clubbing/Cyanosis] : no extremity clubbing/cyanosis [Soft] : abdomen soft [Non Tender] : non-tender [Non-distended] : non-distended [No Masses] : no abdominal mass palpated [No HSM] : no HSM [Normal Bowel Sounds] : normal bowel sounds [No Hernias] : no hernias [Declined Rectal Exam] : declined rectal exam [Normal Supraclavicular Nodes] : no supraclavicular lymphadenopathy [Normal Axillary Nodes] : no axillary lymphadenopathy [Normal Posterior Cervical Nodes] : no posterior cervical lymphadenopathy [Normal Anterior Cervical Nodes] : no anterior cervical lymphadenopathy [Normal Inguinal Nodes] : no inguinal lymphadenopathy [Normal Femoral Nodes] : no femoral lymphadenopathy [No CVA Tenderness] : no CVA  tenderness [No Spinal Tenderness] : no spinal tenderness [No Joint Swelling] : no joint swelling [Grossly Normal Strength/Tone] : grossly normal strength/tone [No Rash] : no rash [Coordination Grossly Intact] : coordination grossly intact [No Focal Deficits] : no focal deficits [Normal Gait] : normal gait [Deep Tendon Reflexes (DTR)] : deep tendon reflexes were 2+ and symmetric [Speech Grossly Normal] : speech grossly normal [Memory Grossly Normal] : memory grossly normal [Normal Affect] : the affect was normal [Alert and Oriented x3] : oriented to person, place, and time [Normal Mood] : the mood was normal [Normal Insight/Judgement] : insight and judgment were intact [de-identified] : Left shoulder range of motion decreased secondary to pain, status post fall work injury torn tendons [de-identified] : Positive Tinel sign, carpal tunnel syndrome, and cubital tunnel syndrome [de-identified] : Anxiety

## 2021-03-25 NOTE — ASSESSMENT
[FreeTextEntry1] : Assessment and plan:\par \par 1.  Generalized anxiety disorder/fatigue/feeling blue patient denies full-blown depression.  Patient has been taken bupropion doing well with the medication in fact has not taking any Valium its been months and doing well with present medical management.\par \par 2.  Chronic pain syndrome patient has been taking oxycodone 10 mg twice a day as needed patient has been taking medications as prescribed I stop checked there is no sign of abuse continue present medical management.  Patient utilizes medications appropriately for pain relief so he can perform his usual duties at work.  Patient does admit that he would like to wean oxycodone slowly and hopefully by this summer will be off the medications.\par \par 3.  Status post work accident patient was accidentally hit by a dumpster involving the left side of his body especially his shoulder he will be following up with orthopedic surgery, patient has been doing his own physical therapy at home and doing well his exercise program is slowly increasing.\par \par 4.  Patient will be setting up for comprehensive health maintenance physical exam, continue present medical management slowly wean oxycodone, increase physical activity as tolerated and keep follow-up appointment.\par \par

## 2021-03-25 NOTE — HEALTH RISK ASSESSMENT
[] : No [Yes] : Yes [2 - 4 times a month (2 pts)] : 2-4 times a month (2 points) [1 or 2 (0 pts)] : 1 or 2 (0 points) [Never (0 pts)] : Never (0 points) [No] : In the past 12 months have you used drugs other than those required for medical reasons? No [Any fall with injury in past year] : Patient reported fall with injury in the past year [0] : 2) Feeling down, depressed, or hopeless: Not at all (0) [YearQuit] : 2021 [Audit-CScore] : 2 [[id number]] : 0

## 2021-03-25 NOTE — CURRENT MEDS
[Takes medication as prescribed] : takes [Yes] : Reviewed medication list for presence of high-risk medications. [Opioids] : opioids

## 2021-05-07 ENCOUNTER — APPOINTMENT (OUTPATIENT)
Dept: FAMILY MEDICINE | Facility: CLINIC | Age: 56
End: 2021-05-07
Payer: COMMERCIAL

## 2021-05-07 VITALS
TEMPERATURE: 98.1 F | BODY MASS INDEX: 26.25 KG/M2 | HEART RATE: 103 BPM | SYSTOLIC BLOOD PRESSURE: 116 MMHG | OXYGEN SATURATION: 96 % | RESPIRATION RATE: 15 BRPM | HEIGHT: 68 IN | WEIGHT: 173.19 LBS | DIASTOLIC BLOOD PRESSURE: 71 MMHG

## 2021-05-07 PROCEDURE — 99072 ADDL SUPL MATRL&STAF TM PHE: CPT

## 2021-05-07 PROCEDURE — 99213 OFFICE O/P EST LOW 20 MIN: CPT

## 2021-05-07 NOTE — PHYSICAL EXAM
[Well Nourished] : well nourished [Well Developed] : well developed [Well-Appearing] : well-appearing [Normal Voice/Communication] : normal voice/communication [Normal Sclera/Conjunctiva] : normal sclera/conjunctiva [PERRL] : pupils equal round and reactive to light [EOMI] : extraocular movements intact [Normal Outer Ear/Nose] : the outer ears and nose were normal in appearance [Normal Oropharynx] : the oropharynx was normal [Normal TMs] : both tympanic membranes were normal [Normal Nasal Mucosa] : the nasal mucosa was normal [No JVD] : no jugular venous distention [No Lymphadenopathy] : no lymphadenopathy [Supple] : supple [Thyroid Normal, No Nodules] : the thyroid was normal and there were no nodules present [No Respiratory Distress] : no respiratory distress  [No Accessory Muscle Use] : no accessory muscle use [Clear to Auscultation] : lungs were clear to auscultation bilaterally [Normal Rate] : normal rate  [Regular Rhythm] : with a regular rhythm [Normal S1, S2] : normal S1 and S2 [No Murmur] : no murmur heard [No Carotid Bruits] : no carotid bruits [No Abdominal Bruit] : a ~M bruit was not heard ~T in the abdomen [No Varicosities] : no varicosities [Pedal Pulses Present] : the pedal pulses are present [No Edema] : there was no peripheral edema [No Palpable Aorta] : no palpable aorta [No Extremity Clubbing/Cyanosis] : no extremity clubbing/cyanosis [Soft] : abdomen soft [Non Tender] : non-tender [Non-distended] : non-distended [No Masses] : no abdominal mass palpated [No HSM] : no HSM [Normal Bowel Sounds] : normal bowel sounds [No Hernias] : no hernias [Declined Rectal Exam] : declined rectal exam [Normal Supraclavicular Nodes] : no supraclavicular lymphadenopathy [Normal Axillary Nodes] : no axillary lymphadenopathy [Normal Posterior Cervical Nodes] : no posterior cervical lymphadenopathy [Normal Anterior Cervical Nodes] : no anterior cervical lymphadenopathy [Normal Inguinal Nodes] : no inguinal lymphadenopathy [Normal Femoral Nodes] : no femoral lymphadenopathy [No CVA Tenderness] : no CVA  tenderness [No Spinal Tenderness] : no spinal tenderness [No Joint Swelling] : no joint swelling [Grossly Normal Strength/Tone] : grossly normal strength/tone [No Rash] : no rash [Coordination Grossly Intact] : coordination grossly intact [No Focal Deficits] : no focal deficits [Normal Gait] : normal gait [Deep Tendon Reflexes (DTR)] : deep tendon reflexes were 2+ and symmetric [Speech Grossly Normal] : speech grossly normal [Memory Grossly Normal] : memory grossly normal [Normal Affect] : the affect was normal [Alert and Oriented x3] : oriented to person, place, and time [Normal Mood] : the mood was normal [Normal Insight/Judgement] : insight and judgment were intact [de-identified] : Left shoulder range of motion decreased secondary to pain, status post fall work injury torn tendons [de-identified] : Positive Tinel sign, carpal tunnel syndrome, and cubital tunnel syndrome [de-identified] : Anxiety

## 2021-05-07 NOTE — HISTORY OF PRESENT ILLNESS
[FreeTextEntry1] : See HPI. [de-identified] : Patient is a 55-year-old gentleman who presents today for follow-up and disease management.  Patient suffered for short period of time of increased anxiety secondary to the diagnosis of esophageal carcinoma metastatic to liver and is older brother of 58 years old.  Presently the patient states that the anxiety has been relieved some because his brother has had had a response to the chemotherapy that they are using.  Patient's medical history is significant for chronic pain syndrome, chronic left shoulder pain with paresthesias of left arm status post work accident.\par \par Presently the patient is awake alert and oriented x3 he is in no acute distress calm and cooperative.

## 2021-05-07 NOTE — CURRENT MEDS
[Takes medication as prescribed] : takes [None] : Patient does not have any barriers to medication adherence [Yes] : Reviewed medication list for presence of high-risk medications. [Benzodiazepines] : benzodiazepines [Opioids] : opioids

## 2021-05-07 NOTE — HEALTH RISK ASSESSMENT
[] : No [No] : In the past 12 months have you used drugs other than those required for medical reasons? No [Any fall with injury in past year] : Patient reported fall with injury in the past year [0] : 2) Feeling down, depressed, or hopeless: Not at all (0) [Audit-CScore] : 0 [[id number]] : 0

## 2021-05-07 NOTE — ASSESSMENT
[FreeTextEntry1] : Assessment and plan:\par \par 1.  Anxiety much better controlled in fact the patient has used diazepam very sparingly still has multiple tabs available I stop checked there has been no sign of abuse nor has there been any sign of doctor shopping.\par \par Patient will continue Wellbutrin 150 mg p.o. daily patient tolerating medications is helping with anxiety and also underlying depression.\par \par 2.  Chronic pain syndrome exacerbated by an accident that the patient had at work patient has been taking oxycodone 10 mg up to 3 times a day again I stop checked there is no sign of abuse nor is there any sign of doctor shopping patient is aware of the addictive nature of the medication and he is using the medications extremely sparingly and only as needed.

## 2021-05-07 NOTE — REVIEW OF SYSTEMS
[Joint Pain] : joint pain [Joint Stiffness] : joint stiffness [Muscle Pain] : no muscle pain [Muscle Weakness] : no muscle weakness [Back Pain] : no back pain [Joint Swelling] : no joint swelling [Suicidal] : not suicidal [Insomnia] : insomnia [Anxiety] : anxiety [Depression] : no depression [Negative] : Heme/Lymph [de-identified] : Paresthesia left arm [de-identified] : improving

## 2021-05-28 ENCOUNTER — RX RENEWAL (OUTPATIENT)
Age: 56
End: 2021-05-28

## 2021-06-25 ENCOUNTER — APPOINTMENT (OUTPATIENT)
Dept: FAMILY MEDICINE | Facility: CLINIC | Age: 56
End: 2021-06-25
Payer: COMMERCIAL

## 2021-06-25 VITALS
BODY MASS INDEX: 25.61 KG/M2 | SYSTOLIC BLOOD PRESSURE: 112 MMHG | HEIGHT: 68 IN | TEMPERATURE: 97.2 F | WEIGHT: 169 LBS | RESPIRATION RATE: 14 BRPM | OXYGEN SATURATION: 98 % | DIASTOLIC BLOOD PRESSURE: 70 MMHG | HEART RATE: 83 BPM

## 2021-06-25 PROCEDURE — 99072 ADDL SUPL MATRL&STAF TM PHE: CPT

## 2021-06-25 PROCEDURE — 99213 OFFICE O/P EST LOW 20 MIN: CPT

## 2021-06-25 NOTE — REVIEW OF SYSTEMS
[Joint Pain] : joint pain [Joint Stiffness] : joint stiffness [Insomnia] : insomnia [Anxiety] : anxiety [Negative] : Heme/Lymph [Muscle Pain] : no muscle pain [Muscle Weakness] : no muscle weakness [Back Pain] : no back pain [Joint Swelling] : no joint swelling [Suicidal] : not suicidal [Depression] : no depression [de-identified] : Paresthesia left arm [de-identified] : improving

## 2021-06-25 NOTE — HEALTH RISK ASSESSMENT
[] : Yes [0-5] : 0-5 [2 - 4 times a month (2 pts)] : 2-4 times a month (2 points) [1 or 2 (0 pts)] : 1 or 2 (0 points) [Never (0 pts)] : Never (0 points) [Yes] : In the past 12 months have you used drugs other than those required for medical reasons? Yes [Any fall with injury in past year] : Patient reported fall with injury in the past year [0] : 2) Feeling down, depressed, or hopeless: Not at all (0) [Audit-CScore] : 2 [de-identified] : Occasional marijuana  [[id number]] : 0

## 2021-06-25 NOTE — PHYSICAL EXAM
[Well Nourished] : well nourished [Well Developed] : well developed [Well-Appearing] : well-appearing [Normal Voice/Communication] : normal voice/communication [Normal Sclera/Conjunctiva] : normal sclera/conjunctiva [PERRL] : pupils equal round and reactive to light [EOMI] : extraocular movements intact [Normal Outer Ear/Nose] : the outer ears and nose were normal in appearance [Normal Oropharynx] : the oropharynx was normal [Normal TMs] : both tympanic membranes were normal [Normal Nasal Mucosa] : the nasal mucosa was normal [No JVD] : no jugular venous distention [No Lymphadenopathy] : no lymphadenopathy [Supple] : supple [Thyroid Normal, No Nodules] : the thyroid was normal and there were no nodules present [No Respiratory Distress] : no respiratory distress  [No Accessory Muscle Use] : no accessory muscle use [Clear to Auscultation] : lungs were clear to auscultation bilaterally [Normal Rate] : normal rate  [Regular Rhythm] : with a regular rhythm [Normal S1, S2] : normal S1 and S2 [No Murmur] : no murmur heard [No Carotid Bruits] : no carotid bruits [No Abdominal Bruit] : a ~M bruit was not heard ~T in the abdomen [No Varicosities] : no varicosities [Pedal Pulses Present] : the pedal pulses are present [No Edema] : there was no peripheral edema [No Palpable Aorta] : no palpable aorta [No Extremity Clubbing/Cyanosis] : no extremity clubbing/cyanosis [Soft] : abdomen soft [Non Tender] : non-tender [Non-distended] : non-distended [No Masses] : no abdominal mass palpated [No HSM] : no HSM [Normal Bowel Sounds] : normal bowel sounds [No Hernias] : no hernias [Declined Rectal Exam] : declined rectal exam [Normal Supraclavicular Nodes] : no supraclavicular lymphadenopathy [Normal Axillary Nodes] : no axillary lymphadenopathy [Normal Posterior Cervical Nodes] : no posterior cervical lymphadenopathy [Normal Anterior Cervical Nodes] : no anterior cervical lymphadenopathy [Normal Inguinal Nodes] : no inguinal lymphadenopathy [Normal Femoral Nodes] : no femoral lymphadenopathy [No CVA Tenderness] : no CVA  tenderness [No Spinal Tenderness] : no spinal tenderness [No Joint Swelling] : no joint swelling [Grossly Normal Strength/Tone] : grossly normal strength/tone [No Rash] : no rash [Coordination Grossly Intact] : coordination grossly intact [No Focal Deficits] : no focal deficits [Normal Gait] : normal gait [Deep Tendon Reflexes (DTR)] : deep tendon reflexes were 2+ and symmetric [Speech Grossly Normal] : speech grossly normal [Memory Grossly Normal] : memory grossly normal [Normal Affect] : the affect was normal [Alert and Oriented x3] : oriented to person, place, and time [Normal Mood] : the mood was normal [Normal Insight/Judgement] : insight and judgment were intact [de-identified] : Left shoulder range of motion decreased secondary to pain, status post fall work injury torn tendons [de-identified] : Positive Tinel sign, carpal tunnel syndrome, and cubital tunnel syndrome [de-identified] : Anxiety

## 2021-06-25 NOTE — ASSESSMENT
[FreeTextEntry1] : Assessment and plan:\par \par 1.  Anxiety much better controlled in fact the patient has used diazepam very sparingly still has multiple tabs available I stop checked there has been no sign of abuse nor has there been any sign of doctor shopping.\par \par Patient will continue Wellbutrin 150 mg p.o. daily patient tolerating medications is helping with anxiety and also underlying depression.\par \par 2.  Chronic pain syndrome exacerbated by an accident that the patient had at work patient has been taking oxycodone 10 mg up to 3 times a day again I stop checked there is no sign of abuse nor is there any sign of doctor shopping patient is aware of the addictive nature of the medication and he is using the medications extremely sparingly and only as needed.  Unfortunately pain persists my recommendations are that the patient obtain a second opinion regarding his left shoulder.

## 2021-06-25 NOTE — HISTORY OF PRESENT ILLNESS
[FreeTextEntry1] : See HPI. [de-identified] : Patient is a 55-year-old gentleman who presents today for follow-up and disease management patient complaining of severe left shoulder pain he also has an underlying chronic pain syndrome and he also has paresthesias of his left upper extremity described as numbness and tingling.  Patient was being followed by orthopedic surgery status post intra-articular joint injection patient has already received 2 intra-articular joint injections which lasted very short time.  Presently he is awake alert and oriented x3.

## 2021-06-25 NOTE — COUNSELING
[Fall prevention counseling provided] : Fall prevention counseling provided [Adequate lighting] : Adequate lighting [No throw rugs] : No throw rugs [Use proper foot wear] : Use proper foot wear [Behavioral health counseling provided] : Behavioral health counseling provided [Sleep ___ hours/day] : Sleep [unfilled] hours/day [Engage in a relaxing activity] : Engage in a relaxing activity [Plan in advance] : Plan in advance [Risk of tobacco use and health benefits of smoking cessation discussed] : Risk of tobacco use and health benefits of smoking cessation discussed [Cessation strategies including cessation program discussed] : Cessation strategies including cessation program discussed [Use of nicotine replacement therapies and other medications discussed] : Use of nicotine replacement therapies and other medications discussed [Encouraged to pick a quit date and identify support needed to quit] : Encouraged to pick a quit date and identify support needed to quit [AUDIT-C Screening administered and reviewed] : AUDIT-C Screening administered and reviewed [None] : None [Good understanding] : Patient has a good understanding of lifestyle changes and steps needed to achieve self management goal

## 2021-08-13 ENCOUNTER — APPOINTMENT (OUTPATIENT)
Dept: FAMILY MEDICINE | Facility: CLINIC | Age: 56
End: 2021-08-13
Payer: COMMERCIAL

## 2021-08-13 VITALS
HEIGHT: 68 IN | OXYGEN SATURATION: 95 % | RESPIRATION RATE: 15 BRPM | HEART RATE: 61 BPM | SYSTOLIC BLOOD PRESSURE: 128 MMHG | BODY MASS INDEX: 24.4 KG/M2 | TEMPERATURE: 97.6 F | WEIGHT: 161 LBS | DIASTOLIC BLOOD PRESSURE: 82 MMHG

## 2021-08-13 PROCEDURE — 99213 OFFICE O/P EST LOW 20 MIN: CPT

## 2021-08-13 NOTE — PHYSICAL EXAM
[No Acute Distress] : no acute distress [Well Nourished] : well nourished [Well Developed] : well developed [Well-Appearing] : well-appearing [Normal Voice/Communication] : normal voice/communication [Normal Sclera/Conjunctiva] : normal sclera/conjunctiva [PERRL] : pupils equal round and reactive to light [EOMI] : extraocular movements intact [Normal Outer Ear/Nose] : the outer ears and nose were normal in appearance [Normal Oropharynx] : the oropharynx was normal [Normal TMs] : both tympanic membranes were normal [Normal Nasal Mucosa] : the nasal mucosa was normal [No JVD] : no jugular venous distention [No Lymphadenopathy] : no lymphadenopathy [Supple] : supple [Thyroid Normal, No Nodules] : the thyroid was normal and there were no nodules present [No Respiratory Distress] : no respiratory distress  [No Accessory Muscle Use] : no accessory muscle use [Clear to Auscultation] : lungs were clear to auscultation bilaterally [Normal Rate] : normal rate  [Regular Rhythm] : with a regular rhythm [Normal S1, S2] : normal S1 and S2 [No Murmur] : no murmur heard [No Carotid Bruits] : no carotid bruits [No Abdominal Bruit] : a ~M bruit was not heard ~T in the abdomen [No Varicosities] : no varicosities [Pedal Pulses Present] : the pedal pulses are present [No Edema] : there was no peripheral edema [No Palpable Aorta] : no palpable aorta [No Extremity Clubbing/Cyanosis] : no extremity clubbing/cyanosis [Soft] : abdomen soft [Non Tender] : non-tender [Non-distended] : non-distended [No Masses] : no abdominal mass palpated [No HSM] : no HSM [Normal Bowel Sounds] : normal bowel sounds [No Hernias] : no hernias [Declined Rectal Exam] : declined rectal exam [Normal Supraclavicular Nodes] : no supraclavicular lymphadenopathy [Normal Axillary Nodes] : no axillary lymphadenopathy [Normal Posterior Cervical Nodes] : no posterior cervical lymphadenopathy [Normal Anterior Cervical Nodes] : no anterior cervical lymphadenopathy [Normal Inguinal Nodes] : no inguinal lymphadenopathy [Normal Femoral Nodes] : no femoral lymphadenopathy [No CVA Tenderness] : no CVA  tenderness [No Spinal Tenderness] : no spinal tenderness [No Joint Swelling] : no joint swelling [Grossly Normal Strength/Tone] : grossly normal strength/tone [No Rash] : no rash [Coordination Grossly Intact] : coordination grossly intact [No Focal Deficits] : no focal deficits [Normal Gait] : normal gait [Deep Tendon Reflexes (DTR)] : deep tendon reflexes were 2+ and symmetric [Speech Grossly Normal] : speech grossly normal [Memory Grossly Normal] : memory grossly normal [Normal Affect] : the affect was normal [Alert and Oriented x3] : oriented to person, place, and time [Normal Mood] : the mood was normal [Normal Insight/Judgement] : insight and judgment were intact [de-identified] : Left shoulder range of motion decreased secondary to pain, status post fall work injury torn tendons [de-identified] : Positive Tinel sign, carpal tunnel syndrome, and cubital tunnel syndrome [de-identified] : Anxiety

## 2021-08-13 NOTE — HISTORY OF PRESENT ILLNESS
[FreeTextEntry1] : See HPI. [de-identified] : Is a 55-year-old gentleman who presents today for follow-up and disease management patient has an underlying chronic pain syndrome secondary to severe chronic left shoulder pain with paresthesias of left arm status post work injury.  Patient also has underlying generalized anxiety.\par \par Presently the patient is awake alert and oriented x3 in no acute distress but is complaining of left shoulder pain.

## 2021-08-13 NOTE — ASSESSMENT
[FreeTextEntry1] : Assessment and plan:\par \par 1.  Anxiety much better controlled in fact the patient has used diazepam very sparingly still has multiple tabs available I stop checked there has been no sign of abuse nor has there been any sign of doctor shopping.\par \par Patient will continue Wellbutrin 150 mg p.o. daily patient tolerating medications is helping with anxiety and also underlying depression.\par \par 2.  Chronic pain syndrome exacerbated by an accident that the patient had at work patient has been taking oxycodone 10 mg up to 3 times a day again I stop checked there is no sign of abuse nor is there any sign of doctor shopping patient is aware of the addictive nature of the medication and he is using the medications extremely sparingly and only as needed.  Unfortunately pain persists my recommendations are that the patient obtain a second opinion regarding his left shoulder.  Recently we attempted tramadol for breakthrough pain unfortunately did not agree with the patient patient states that it made him queasy.

## 2021-08-13 NOTE — COUNSELING
[Fall prevention counseling provided] : Fall prevention counseling provided [Behavioral health counseling provided] : Behavioral health counseling provided [Sleep ___ hours/day] : Sleep [unfilled] hours/day [Engage in a relaxing activity] : Engage in a relaxing activity [Plan in advance] : Plan in advance [AUDIT-C Screening administered and reviewed] : AUDIT-C Screening administered and reviewed [None] : None [Good understanding] : Patient has a good understanding of lifestyle changes and steps needed to achieve self management goal

## 2021-08-13 NOTE — REVIEW OF SYSTEMS
[Joint Pain] : joint pain [Joint Stiffness] : joint stiffness [Insomnia] : insomnia [Anxiety] : anxiety [Negative] : Heme/Lymph [Muscle Pain] : no muscle pain [Muscle Weakness] : no muscle weakness [Back Pain] : no back pain [Joint Swelling] : no joint swelling [Suicidal] : not suicidal [Depression] : no depression [de-identified] : Paresthesia left arm [de-identified] : improving

## 2021-08-13 NOTE — HEALTH RISK ASSESSMENT
[20 or more] : 20 or more [Yes] : Yes [2 - 4 times a month (2 pts)] : 2-4 times a month (2 points) [1 or 2 (0 pts)] : 1 or 2 (0 points) [Never (0 pts)] : Never (0 points) [No] : In the past 12 months have you used drugs other than those required for medical reasons? No [No falls in past year] : Patient reported no falls in the past year [0] : 2) Feeling down, depressed, or hopeless: Not at all (0) [PHQ-2 Negative - No further assessment needed] : PHQ-2 Negative - No further assessment needed [] : No [YearQuit] : 2010 [Audit-CScore] : 2 [[id number]] : 0

## 2021-09-27 ENCOUNTER — APPOINTMENT (OUTPATIENT)
Dept: FAMILY MEDICINE | Facility: CLINIC | Age: 56
End: 2021-09-27
Payer: COMMERCIAL

## 2021-09-27 VITALS
SYSTOLIC BLOOD PRESSURE: 120 MMHG | HEIGHT: 68 IN | OXYGEN SATURATION: 98 % | RESPIRATION RATE: 15 BRPM | HEART RATE: 102 BPM | WEIGHT: 168 LBS | BODY MASS INDEX: 25.46 KG/M2 | TEMPERATURE: 97.7 F | DIASTOLIC BLOOD PRESSURE: 80 MMHG

## 2021-09-27 DIAGNOSIS — Z23 ENCOUNTER FOR IMMUNIZATION: ICD-10-CM

## 2021-09-27 PROCEDURE — 90686 IIV4 VACC NO PRSV 0.5 ML IM: CPT

## 2021-09-27 PROCEDURE — G0008: CPT

## 2021-09-27 PROCEDURE — 99214 OFFICE O/P EST MOD 30 MIN: CPT | Mod: 25

## 2021-09-27 NOTE — ASSESSMENT
[FreeTextEntry1] : Assessment and plan:\par \par 1.  Anxiety much better controlled in fact the patient has used diazepam very sparingly still has multiple tabs available I stop checked there has been no sign of abuse nor has there been any sign of doctor shopping.\par \par Patient will continue Wellbutrin 150 mg p.o. daily patient tolerating medications is helping with anxiety and also underlying depression.\par \par 2.  Chronic pain syndrome exacerbated by an accident that the patient had at work patient has been taking oxycodone 10 mg up to 3 times a day again I stop checked there is no sign of abuse nor is there any sign of doctor shopping patient is aware of the addictive nature of the medication and he is using the medications extremely sparingly and only as needed.  Unfortunately pain persists my recommendations are that the patient obtain a second opinion regarding his left shoulder.\par \par 3.  Maintenance issues reviewed and discussed with patient patient is up-to-date with COVID-19 vaccination he has had both doses of Moderna.  I also recommend comprehensive health maintenance physical exam patient will be making that appointment today as his next visit at that time I will obtain comprehensive blood work and also electrocardiogram will be done.\par \par 4.  Fluzone quadrivalent influenza vaccine administered at today's visit left deltoid.

## 2021-09-27 NOTE — PHYSICAL EXAM
[No Acute Distress] : no acute distress [Well Nourished] : well nourished [Well Developed] : well developed [Well-Appearing] : well-appearing [Normal Voice/Communication] : normal voice/communication [Normal Sclera/Conjunctiva] : normal sclera/conjunctiva [PERRL] : pupils equal round and reactive to light [EOMI] : extraocular movements intact [Normal Outer Ear/Nose] : the outer ears and nose were normal in appearance [Normal Oropharynx] : the oropharynx was normal [Normal TMs] : both tympanic membranes were normal [Normal Nasal Mucosa] : the nasal mucosa was normal [No JVD] : no jugular venous distention [No Lymphadenopathy] : no lymphadenopathy [Supple] : supple [Thyroid Normal, No Nodules] : the thyroid was normal and there were no nodules present [No Respiratory Distress] : no respiratory distress  [No Accessory Muscle Use] : no accessory muscle use [Clear to Auscultation] : lungs were clear to auscultation bilaterally [Normal Rate] : normal rate  [Regular Rhythm] : with a regular rhythm [Normal S1, S2] : normal S1 and S2 [No Murmur] : no murmur heard [No Carotid Bruits] : no carotid bruits [No Abdominal Bruit] : a ~M bruit was not heard ~T in the abdomen [No Varicosities] : no varicosities [Pedal Pulses Present] : the pedal pulses are present [No Edema] : there was no peripheral edema [No Palpable Aorta] : no palpable aorta [No Extremity Clubbing/Cyanosis] : no extremity clubbing/cyanosis [Soft] : abdomen soft [Non Tender] : non-tender [Non-distended] : non-distended [No Masses] : no abdominal mass palpated [No HSM] : no HSM [Normal Bowel Sounds] : normal bowel sounds [No Hernias] : no hernias [Declined Rectal Exam] : declined rectal exam [Normal Supraclavicular Nodes] : no supraclavicular lymphadenopathy [Normal Axillary Nodes] : no axillary lymphadenopathy [Normal Posterior Cervical Nodes] : no posterior cervical lymphadenopathy [Normal Anterior Cervical Nodes] : no anterior cervical lymphadenopathy [Normal Inguinal Nodes] : no inguinal lymphadenopathy [Normal Femoral Nodes] : no femoral lymphadenopathy [No CVA Tenderness] : no CVA  tenderness [No Spinal Tenderness] : no spinal tenderness [No Joint Swelling] : no joint swelling [Grossly Normal Strength/Tone] : grossly normal strength/tone [No Rash] : no rash [Coordination Grossly Intact] : coordination grossly intact [No Focal Deficits] : no focal deficits [Normal Gait] : normal gait [Deep Tendon Reflexes (DTR)] : deep tendon reflexes were 2+ and symmetric [Speech Grossly Normal] : speech grossly normal [Memory Grossly Normal] : memory grossly normal [Normal Affect] : the affect was normal [Alert and Oriented x3] : oriented to person, place, and time [Normal Mood] : the mood was normal [Normal Insight/Judgement] : insight and judgment were intact [de-identified] : Left shoulder range of motion decreased secondary to pain, status post fall work injury torn tendons [de-identified] : Positive Tinel sign, carpal tunnel syndrome, and cubital tunnel syndrome [de-identified] : Anxiety

## 2021-09-27 NOTE — HISTORY OF PRESENT ILLNESS
[FreeTextEntry1] : See HPI. [de-identified] : Is a 55-year-old gentleman who presents today for follow-up and disease management patient has an underlying chronic pain syndrome secondary to severe chronic left shoulder pain with paresthesias of left arm status post work injury.  Patient was diagnosed with both cubital tunnel syndrome and carpal tunnel syndrome left upper extremity.  Patient also has underlying generalized anxiety.\par \par Presently the patient is awake alert and oriented x3 in no acute distress but is complaining of left shoulder pain.

## 2021-09-27 NOTE — HEALTH RISK ASSESSMENT
[20 or more] : 20 or more [Yes] : Yes [2 - 4 times a month (2 pts)] : 2-4 times a month (2 points) [1 or 2 (0 pts)] : 1 or 2 (0 points) [Never (0 pts)] : Never (0 points) [No] : In the past 12 months have you used drugs other than those required for medical reasons? No [No falls in past year] : Patient reported no falls in the past year [0] : 2) Feeling down, depressed, or hopeless: Not at all (0) [PHQ-2 Negative - No further assessment needed] : PHQ-2 Negative - No further assessment needed [] : No [YearQuit] : 2010 [Audit-CScore] : 2 [[id number]] : 0

## 2021-09-27 NOTE — REVIEW OF SYSTEMS
[Joint Pain] : joint pain [Joint Stiffness] : joint stiffness [Insomnia] : insomnia [Anxiety] : anxiety [Negative] : Heme/Lymph [Muscle Pain] : no muscle pain [Muscle Weakness] : no muscle weakness [Back Pain] : no back pain [Joint Swelling] : no joint swelling [Suicidal] : not suicidal [Depression] : no depression [de-identified] : Paresthesia left arm [de-identified] : improving

## 2021-09-27 NOTE — COUNSELING
[Fall prevention counseling provided] : Fall prevention counseling provided [Adequate lighting] : Adequate lighting [No throw rugs] : No throw rugs [Use proper foot wear] : Use proper foot wear [Behavioral health counseling provided] : Behavioral health counseling provided [Sleep ___ hours/day] : Sleep [unfilled] hours/day [Engage in a relaxing activity] : Engage in a relaxing activity [Plan in advance] : Plan in advance [Risk of tobacco use and health benefits of smoking cessation discussed] : Risk of tobacco use and health benefits of smoking cessation discussed [AUDIT-C Screening administered and reviewed] : AUDIT-C Screening administered and reviewed [None] : None [Good understanding] : Patient has a good understanding of lifestyle changes and steps needed to achieve self management goal

## 2021-12-30 ENCOUNTER — NON-APPOINTMENT (OUTPATIENT)
Age: 56
End: 2021-12-30

## 2021-12-30 ENCOUNTER — LABORATORY RESULT (OUTPATIENT)
Age: 56
End: 2021-12-30

## 2021-12-30 ENCOUNTER — APPOINTMENT (OUTPATIENT)
Dept: FAMILY MEDICINE | Facility: CLINIC | Age: 56
End: 2021-12-30
Payer: COMMERCIAL

## 2021-12-30 VITALS
SYSTOLIC BLOOD PRESSURE: 112 MMHG | OXYGEN SATURATION: 98 % | TEMPERATURE: 97.3 F | HEART RATE: 107 BPM | HEIGHT: 66 IN | WEIGHT: 166 LBS | DIASTOLIC BLOOD PRESSURE: 80 MMHG | BODY MASS INDEX: 26.68 KG/M2 | RESPIRATION RATE: 16 BRPM

## 2021-12-30 PROCEDURE — 99396 PREV VISIT EST AGE 40-64: CPT | Mod: 25

## 2021-12-30 PROCEDURE — 36415 COLL VENOUS BLD VENIPUNCTURE: CPT

## 2021-12-30 PROCEDURE — 93000 ELECTROCARDIOGRAM COMPLETE: CPT

## 2021-12-30 NOTE — HEALTH RISK ASSESSMENT
[Very Good] : ~his/her~  mood as very good [Former] : Former [20 or more] : 20 or more [Yes] : Yes [2 - 3 times a week (3 pts)] : 2 - 3  times a week (3 points) [1 or 2 (0 pts)] : 1 or 2 (0 points) [Never (0 pts)] : Never (0 points) [No] : In the past 12 months have you used drugs other than those required for medical reasons? No [No falls in past year] : Patient reported no falls in the past year [0] : 2) Feeling down, depressed, or hopeless: Not at all (0) [PHQ-2 Negative - No further assessment needed] : PHQ-2 Negative - No further assessment needed [Patient reported colonoscopy was normal] : Patient reported colonoscopy was normal [HIV Test offered] : HIV Test offered [Hepatitis C test offered] : Hepatitis C test offered [None] : None [Alone] : lives alone [Employed] : employed [High School] : high school [] :  [# Of Children ___] : has [unfilled] children [Feels Safe at Home] : Feels safe at home [Fully functional (bathing, dressing, toileting, transferring, walking, feeding)] : Fully functional (bathing, dressing, toileting, transferring, walking, feeding) [Fully functional (using the telephone, shopping, preparing meals, housekeeping, doing laundry, using] : Fully functional and needs no help or supervision to perform IADLs (using the telephone, shopping, preparing meals, housekeeping, doing laundry, using transportation, managing medications and managing finances) [Reports normal functional visual acuity (ie: able to read med bottle)] : Reports normal functional visual acuity [Smoke Detector] : smoke detector [Carbon Monoxide Detector] : carbon monoxide detector [Safety elements used in home] : safety elements used in home [Seat Belt] :  uses seat belt [Sunscreen] : uses sunscreen [With Patient/Caregiver] : , with patient/caregiver [Reviewed no changes] : Reviewed, no changes [Designated Healthcare Proxy] : Designated healthcare proxy [Name: ___] : Health Care Proxy's Name: [unfilled]  [Relationship: ___] : Relationship: [unfilled] [Aggressive treatment] : aggressive treatment [I will adhere to the patient's wishes.] : I will adhere to the patient's wishes. [YearQuit] : 2007 [Audit-CScore] : 3 [[id number]] : 0 [Change in mental status noted] : No change in mental status noted [Language] : denies difficulty with language [Behavior] : denies difficulty with behavior [Learning/Retaining New Information] : denies difficulty learning/retaining new information [Handling Complex Tasks] : denies difficulty handling complex tasks [Reasoning] : denies difficulty with reasoning [Spatial Ability and Orientation] : denies difficulty with spatial ability and orientation [Sexually Active] : not sexually active [High Risk Behavior] : no high risk behavior [Reports changes in hearing] : Reports no changes in hearing [Reports changes in vision] : Reports no changes in vision [Reports changes in dental health] : Reports no changes in dental health [Travel to Developing Areas] : does not  travel to developing areas [TB Exposure] : is not being exposed to tuberculosis [Caregiver Concerns] : does not have caregiver concerns [ColonoscopyDate] : 09/17 [de-identified] : Patient utilizes reading glasses [AdvancecareDate] : 12/21

## 2021-12-30 NOTE — HISTORY OF PRESENT ILLNESS
[FreeTextEntry1] : See HPI. [de-identified] : Patient is a 56-year-old gentleman who presents today for annual wellness exam but during the interim patient had a work injury which occurred on December 14, 2021 which is a Workmen's Compensation case patient is being followed by orthopedic surgery status post MRI results are presently not available.\par \par Medical history is significant for anxiety, chronic left shoulder pain chronic pain syndrome hypertriglyceridemia and unfortunately patient still has paresthesia of his left arm.\par \par Presently the patient is awake alert and oriented x3 in no acute distress calm and cooperative.  Patient is having difficulty with looking up due to neck pain and states that he gets dizzy when he looks up and nauseous.

## 2021-12-30 NOTE — REVIEW OF SYSTEMS
[Joint Pain] : joint pain [Joint Stiffness] : joint stiffness [Muscle Pain] : muscle pain [Back Pain] : back pain [Insomnia] : insomnia [Anxiety] : anxiety [Negative] : Heme/Lymph [Muscle Weakness] : no muscle weakness [Joint Swelling] : no joint swelling [Suicidal] : not suicidal [Depression] : no depression [de-identified] : Paresthesia left arm

## 2021-12-30 NOTE — ASSESSMENT
[FreeTextEntry1] : Assessment and plan:\par \par 1.  Anxiety much better controlled in fact the patient has used diazepam very sparingly still has multiple tabs available I stop checked there has been no sign of abuse nor has there been any sign of doctor shopping.\par \par Patient will continue Wellbutrin 150 mg p.o. daily patient tolerating medications is helping with anxiety and also underlying depression.\par \par 2.  Chronic pain syndrome exacerbated by an accident that the patient had at work.  Due to the new injury new patient has been taking extra oxycodone therefore I will prescribe instead of 3 times a day 4 times a day for short period of time until pain is better controlled and patient evaluated by orthopedics.\par \par 3.  Maintenance issues reviewed and discussed with patient patient is up-to-date with COVID-19 vaccination he has had both doses of Moderna and booster.\par \par 4.  Overuse injury which occurred at work MRI done presently pending results follow-up with orthopedic surgery this is a Workmen's Compensation case.\par \par 5.  Comprehensive blood work drawn in office by examiner, electrocardiogram shows no acute ST-T wave changes patient is up-to-date with COVID-19 vaccination as stated earlier.

## 2021-12-30 NOTE — PHYSICAL EXAM
[No Acute Distress] : no acute distress [Well Nourished] : well nourished [Well Developed] : well developed [Well-Appearing] : well-appearing [Normal Voice/Communication] : normal voice/communication [Normal Sclera/Conjunctiva] : normal sclera/conjunctiva [PERRL] : pupils equal round and reactive to light [EOMI] : extraocular movements intact [Normal Outer Ear/Nose] : the outer ears and nose were normal in appearance [Normal Oropharynx] : the oropharynx was normal [Normal TMs] : both tympanic membranes were normal [Normal Nasal Mucosa] : the nasal mucosa was normal [No JVD] : no jugular venous distention [No Lymphadenopathy] : no lymphadenopathy [Thyroid Normal, No Nodules] : the thyroid was normal and there were no nodules present [No Respiratory Distress] : no respiratory distress  [No Accessory Muscle Use] : no accessory muscle use [Clear to Auscultation] : lungs were clear to auscultation bilaterally [Normal Rate] : normal rate  [Regular Rhythm] : with a regular rhythm [Normal S1, S2] : normal S1 and S2 [No Murmur] : no murmur heard [No Carotid Bruits] : no carotid bruits [No Abdominal Bruit] : a ~M bruit was not heard ~T in the abdomen [No Varicosities] : no varicosities [Pedal Pulses Present] : the pedal pulses are present [No Edema] : there was no peripheral edema [No Palpable Aorta] : no palpable aorta [No Extremity Clubbing/Cyanosis] : no extremity clubbing/cyanosis [Soft] : abdomen soft [Non Tender] : non-tender [Non-distended] : non-distended [No Masses] : no abdominal mass palpated [No HSM] : no HSM [Normal Bowel Sounds] : normal bowel sounds [No Hernias] : no hernias [Declined Rectal Exam] : declined rectal exam [Urethral Meatus] : meatus normal [Penis Abnormality] : normal circumcised penis [Urinary Bladder Findings] : the bladder was normal on palpation [Scrotum] : the scrotum was normal [Epididymis] : the epididymides were normal [Testes Tenderness] : no tenderness of the testes [Normal Supraclavicular Nodes] : no supraclavicular lymphadenopathy [Normal Axillary Nodes] : no axillary lymphadenopathy [Normal Posterior Cervical Nodes] : no posterior cervical lymphadenopathy [Normal Anterior Cervical Nodes] : no anterior cervical lymphadenopathy [Normal Inguinal Nodes] : no inguinal lymphadenopathy [Normal Femoral Nodes] : no femoral lymphadenopathy [No CVA Tenderness] : no CVA  tenderness [No Spinal Tenderness] : no spinal tenderness [No Joint Swelling] : no joint swelling [Grossly Normal Strength/Tone] : grossly normal strength/tone [No Rash] : no rash [Coordination Grossly Intact] : coordination grossly intact [No Focal Deficits] : no focal deficits [Normal Gait] : normal gait [Deep Tendon Reflexes (DTR)] : deep tendon reflexes were 2+ and symmetric [Speech Grossly Normal] : speech grossly normal [Memory Grossly Normal] : memory grossly normal [Normal Affect] : the affect was normal [Alert and Oriented x3] : oriented to person, place, and time [Normal Mood] : the mood was normal [Normal Insight/Judgement] : insight and judgment were intact [de-identified] : Spasm [FreeTextEntry1] : I'm in to much pain to have rectal. [de-identified] : Left shoulder range of motion decreased secondary to pain, status post fall work injury torn tendons [de-identified] : Positive Tinel sign, carpal tunnel syndrome, and cubital tunnel syndrome [de-identified] : Anxiety

## 2022-01-03 LAB
ALBUMIN SERPL ELPH-MCNC: 4.6 G/DL
ALP BLD-CCNC: 75 U/L
ALT SERPL-CCNC: 32 U/L
ANION GAP SERPL CALC-SCNC: 15 MMOL/L
APPEARANCE: CLEAR
AST SERPL-CCNC: 18 U/L
BACTERIA: NEGATIVE
BASOPHILS # BLD AUTO: 0.03 K/UL
BASOPHILS NFR BLD AUTO: 0.3 %
BILIRUB SERPL-MCNC: 0.3 MG/DL
BILIRUBIN URINE: NEGATIVE
BLOOD URINE: NEGATIVE
BUN SERPL-MCNC: 14 MG/DL
CALCIUM SERPL-MCNC: 9.6 MG/DL
CHLORIDE SERPL-SCNC: 100 MMOL/L
CHOLEST SERPL-MCNC: 201 MG/DL
CO2 SERPL-SCNC: 23 MMOL/L
COLOR: YELLOW
CREAT SERPL-MCNC: 0.9 MG/DL
EOSINOPHIL # BLD AUTO: 0.1 K/UL
EOSINOPHIL NFR BLD AUTO: 1.1 %
ESTIMATED AVERAGE GLUCOSE: 117 MG/DL
FOLATE SERPL-MCNC: 9.5 NG/ML
GLUCOSE QUALITATIVE U: NEGATIVE
GLUCOSE SERPL-MCNC: 101 MG/DL
HBA1C MFR BLD HPLC: 5.7 %
HCT VFR BLD CALC: 47.4 %
HCV AB SER QL: NONREACTIVE
HCV S/CO RATIO: 0.1 S/CO
HDLC SERPL-MCNC: 57 MG/DL
HGB BLD-MCNC: 15.8 G/DL
HIV1+2 AB SPEC QL IA.RAPID: NONREACTIVE
HYALINE CASTS: 0 /LPF
IMM GRANULOCYTES NFR BLD AUTO: 0.2 %
KETONES URINE: NEGATIVE
LDLC SERPL CALC-MCNC: 123 MG/DL
LEUKOCYTE ESTERASE URINE: NEGATIVE
LYMPHOCYTES # BLD AUTO: 1.22 K/UL
LYMPHOCYTES NFR BLD AUTO: 13.7 %
MAN DIFF?: NORMAL
MCHC RBC-ENTMCNC: 29.3 PG
MCHC RBC-ENTMCNC: 33.3 GM/DL
MCV RBC AUTO: 87.8 FL
MICROSCOPIC-UA: NORMAL
MONOCYTES # BLD AUTO: 0.79 K/UL
MONOCYTES NFR BLD AUTO: 8.9 %
NEUTROPHILS # BLD AUTO: 6.74 K/UL
NEUTROPHILS NFR BLD AUTO: 75.8 %
NITRITE URINE: NEGATIVE
NONHDLC SERPL-MCNC: 144 MG/DL
PH URINE: 6.5
PLATELET # BLD AUTO: 271 K/UL
POTASSIUM SERPL-SCNC: 4.5 MMOL/L
PROT SERPL-MCNC: 6.9 G/DL
PROTEIN URINE: NEGATIVE
PSA SERPL-MCNC: 1 NG/ML
RBC # BLD: 5.4 M/UL
RBC # FLD: 12.9 %
RED BLOOD CELLS URINE: 1 /HPF
SODIUM SERPL-SCNC: 138 MMOL/L
SPECIFIC GRAVITY URINE: 1.02
SQUAMOUS EPITHELIAL CELLS: 0 /HPF
T4 FREE SERPL-MCNC: 1.2 NG/DL
TRIGL SERPL-MCNC: 104 MG/DL
TSH SERPL-ACNC: 0.78 UIU/ML
UROBILINOGEN URINE: NORMAL
VIT B12 SERPL-MCNC: 1345 PG/ML
WBC # FLD AUTO: 8.9 K/UL
WHITE BLOOD CELLS URINE: 0 /HPF

## 2022-01-11 LAB
AMPHET UR-MCNC: NEGATIVE
BARBITURATES UR-MCNC: NEGATIVE
BENZODIAZ UR-MCNC: NORMAL
COCAINE METAB.OTHER UR-MCNC: NEGATIVE
CREATININE, URINE: 99.8 MG/DL
METHADONE UR-MCNC: NEGATIVE
METHAQUALONE UR-MCNC: NEGATIVE
OPIATES UR-MCNC: NEGATIVE
PCP UR-MCNC: NEGATIVE
PROPOXYPH UR QL: NEGATIVE
THC UR QL: NORMAL

## 2022-01-13 LAB
OXYCODONE, URINE: 453 NG/ML
OXYMORPHONE, URINE: 105 NG/ML

## 2022-02-01 ENCOUNTER — APPOINTMENT (OUTPATIENT)
Dept: FAMILY MEDICINE | Facility: CLINIC | Age: 57
End: 2022-02-01
Payer: COMMERCIAL

## 2022-02-01 VITALS
HEART RATE: 83 BPM | WEIGHT: 172 LBS | OXYGEN SATURATION: 97 % | BODY MASS INDEX: 27.64 KG/M2 | SYSTOLIC BLOOD PRESSURE: 136 MMHG | DIASTOLIC BLOOD PRESSURE: 84 MMHG | TEMPERATURE: 98 F | RESPIRATION RATE: 14 BRPM | HEIGHT: 66 IN

## 2022-02-01 DIAGNOSIS — R20.0 ANESTHESIA OF SKIN: ICD-10-CM

## 2022-02-01 DIAGNOSIS — R20.2 ANESTHESIA OF SKIN: ICD-10-CM

## 2022-02-01 PROCEDURE — 99214 OFFICE O/P EST MOD 30 MIN: CPT

## 2022-02-01 NOTE — HISTORY OF PRESENT ILLNESS
[FreeTextEntry1] : See HPI. [de-identified] : Patient is a 56-year-old gentleman who presents today for follow-up and disease management.  We will be addressing at today's visit generalized anxiety disorder, chronic pain syndrome unfortunately recently the patient also had a work injury now he has chronic left shoulder pain with paresthesias of the left upper extremity subsequently is being seen by orthopedic surgery and they are considering an epidural injection to help with the chronic pain syndrome.  Patient will be following up  February 10 at that time he will be seeing pain management.  Patient is being seen by orthopedic surgery, pain management and physical therapy.\par \par At last visit patient had comprehensive health maintenance physical exam reviewed with patient at today's visit comprehensive blood work.

## 2022-02-01 NOTE — HEALTH RISK ASSESSMENT
[Former] : Former [20 or more] : 20 or more [Yes] : Yes [2 - 3 times a week (3 pts)] : 2 - 3  times a week (3 points) [1 or 2 (0 pts)] : 1 or 2 (0 points) [Never (0 pts)] : Never (0 points) [No] : In the past 12 months have you used drugs other than those required for medical reasons? No [0] : 2) Feeling down, depressed, or hopeless: Not at all (0) [PHQ-2 Negative - No further assessment needed] : PHQ-2 Negative - No further assessment needed [YearQuit] : 2007 [Audit-CScore] : 3 [[id number]] : 0

## 2022-02-01 NOTE — ASSESSMENT
[FreeTextEntry1] : Assessment and plan:\par \par 1.  Generalized anxiety disorder continue diazepam 5 mg as needed I stop checked there is no sign of abuse patient does not take more than 2 tabs in a 24-hour..  Continue also Wellbutrin 150 mg patient has been taking medications every other day and is doing well.\par \par \par \par 2.  Chronic pain syndrome which is multifactorial patient also has had multiple injuries at work presently on oxycodone 10 mg patient takes medications as needed for severe pain he is also following up with pain management and hopefully will be having epidural steroid injection which will decrease the need for opioids.\par \par 3.  Reviewed with patient most recent blood work hemoglobin A1c was slightly elevated not critically elevated at 5.7 discussed with patient no concentrated sweets consistent carbohydrates but since the patient has had this injury at work he has not been physically active.\par \par 4.  Hyperlipidemia cholesterol slightly elevated detailed discussion with patient regarding low-fat low-cholesterol diet patient will be starting exercise but slowly unfortunately his shoulder pain is still present and there are multiple exercises that the patient should not be doing to avoid any further injury.

## 2022-02-01 NOTE — REVIEW OF SYSTEMS
[Joint Pain] : joint pain [Joint Stiffness] : joint stiffness [Muscle Pain] : muscle pain [Back Pain] : back pain [Insomnia] : insomnia [Anxiety] : anxiety [Negative] : Heme/Lymph [Muscle Weakness] : no muscle weakness [Joint Swelling] : no joint swelling [Suicidal] : not suicidal [Depression] : no depression [de-identified] : Paresthesia left arm

## 2022-02-01 NOTE — PHYSICAL EXAM
[No Acute Distress] : no acute distress [Well Nourished] : well nourished [Well Developed] : well developed [Well-Appearing] : well-appearing [Normal Voice/Communication] : normal voice/communication [Normal Sclera/Conjunctiva] : normal sclera/conjunctiva [PERRL] : pupils equal round and reactive to light [EOMI] : extraocular movements intact [Normal Outer Ear/Nose] : the outer ears and nose were normal in appearance [Normal Oropharynx] : the oropharynx was normal [Normal TMs] : both tympanic membranes were normal [Normal Nasal Mucosa] : the nasal mucosa was normal [No JVD] : no jugular venous distention [No Lymphadenopathy] : no lymphadenopathy [Thyroid Normal, No Nodules] : the thyroid was normal and there were no nodules present [No Respiratory Distress] : no respiratory distress  [No Accessory Muscle Use] : no accessory muscle use [Clear to Auscultation] : lungs were clear to auscultation bilaterally [Normal Rate] : normal rate  [Regular Rhythm] : with a regular rhythm [Normal S1, S2] : normal S1 and S2 [No Murmur] : no murmur heard [No Carotid Bruits] : no carotid bruits [No Abdominal Bruit] : a ~M bruit was not heard ~T in the abdomen [No Varicosities] : no varicosities [Pedal Pulses Present] : the pedal pulses are present [No Edema] : there was no peripheral edema [No Palpable Aorta] : no palpable aorta [No Extremity Clubbing/Cyanosis] : no extremity clubbing/cyanosis [Soft] : abdomen soft [Non Tender] : non-tender [Non-distended] : non-distended [No Masses] : no abdominal mass palpated [No HSM] : no HSM [Normal Bowel Sounds] : normal bowel sounds [No Hernias] : no hernias [Urethral Meatus] : meatus normal [Penis Abnormality] : normal circumcised penis [Urinary Bladder Findings] : the bladder was normal on palpation [Scrotum] : the scrotum was normal [Epididymis] : the epididymides were normal [Testes Tenderness] : no tenderness of the testes [Normal Supraclavicular Nodes] : no supraclavicular lymphadenopathy [Normal Axillary Nodes] : no axillary lymphadenopathy [Normal Posterior Cervical Nodes] : no posterior cervical lymphadenopathy [Normal Anterior Cervical Nodes] : no anterior cervical lymphadenopathy [Normal Inguinal Nodes] : no inguinal lymphadenopathy [Normal Femoral Nodes] : no femoral lymphadenopathy [No CVA Tenderness] : no CVA  tenderness [No Spinal Tenderness] : no spinal tenderness [No Joint Swelling] : no joint swelling [Grossly Normal Strength/Tone] : grossly normal strength/tone [No Rash] : no rash [Coordination Grossly Intact] : coordination grossly intact [No Focal Deficits] : no focal deficits [Normal Gait] : normal gait [Deep Tendon Reflexes (DTR)] : deep tendon reflexes were 2+ and symmetric [Speech Grossly Normal] : speech grossly normal [Memory Grossly Normal] : memory grossly normal [Normal Affect] : the affect was normal [Alert and Oriented x3] : oriented to person, place, and time [Normal Mood] : the mood was normal [Normal Insight/Judgement] : insight and judgment were intact [de-identified] : Spasm [de-identified] : Left shoulder range of motion decreased secondary to pain, status post fall work injury torn tendons [de-identified] : Positive Tinel sign, carpal tunnel syndrome, and cubital tunnel syndrome [de-identified] : Anxiety

## 2022-02-03 ENCOUNTER — APPOINTMENT (OUTPATIENT)
Dept: PHYSICAL MEDICINE AND REHAB | Facility: CLINIC | Age: 57
End: 2022-02-03

## 2022-04-01 RX ORDER — TRAMADOL HYDROCHLORIDE 50 MG/1
50 TABLET, COATED ORAL EVERY 6 HOURS
Qty: 56 | Refills: 0 | Status: COMPLETED | COMMUNITY
Start: 2021-07-30 | End: 2022-04-01

## 2022-04-26 ENCOUNTER — APPOINTMENT (OUTPATIENT)
Dept: FAMILY MEDICINE | Facility: CLINIC | Age: 57
End: 2022-04-26
Payer: COMMERCIAL

## 2022-04-26 VITALS
HEIGHT: 66 IN | DIASTOLIC BLOOD PRESSURE: 87 MMHG | BODY MASS INDEX: 27.97 KG/M2 | OXYGEN SATURATION: 98 % | SYSTOLIC BLOOD PRESSURE: 136 MMHG | HEART RATE: 84 BPM | TEMPERATURE: 98 F | WEIGHT: 174 LBS | RESPIRATION RATE: 15 BRPM

## 2022-04-26 PROCEDURE — 99214 OFFICE O/P EST MOD 30 MIN: CPT

## 2022-04-26 NOTE — HEALTH RISK ASSESSMENT
[Former] : Former [20 or more] : 20 or more [Yes] : Yes [2 - 3 times a week (3 pts)] : 2 - 3  times a week (3 points) [1 or 2 (0 pts)] : 1 or 2 (0 points) [Never (0 pts)] : Never (0 points) [No falls in past year] : Patient reported no falls in the past year [0] : 2) Feeling down, depressed, or hopeless: Not at all (0) [PHQ-2 Negative - No further assessment needed] : PHQ-2 Negative - No further assessment needed [YearQuit] : 2007 [Audit-CScore] : 3 [[id number]] : 0

## 2022-04-26 NOTE — ASSESSMENT
[FreeTextEntry1] : Assessment and plan:\par \par 1.  Generalized anxiety disorder continue diazepam 5 mg as needed I stop checked there is no sign of abuse patient does not take more than 2 tabs in a 24-hour..  Continue also Wellbutrin 150 mg patient has been taking medications every other day and is doing well.  Presently patient is stable even though this past February his brother passed he has no suicidal or homicidal ideation he is not severely depressed but saddened by the fact his brother passed at the age of 61.\par \par 2.  Chronic pain syndrome which is multifactorial patient also has had multiple injuries at work presently on oxycodone 10 mg patient takes medications as needed for severe pain he is also following up with pain management and hopefully will be having epidural steroid injection which will decrease the need for opioids.\par \par 3.  Reviewed with patient most recent blood work hemoglobin A1c was slightly elevated not critically elevated at 5.7 discussed with patient no concentrated sweets consistent carbohydrates but since the patient has had this injury at work he has not been physically active.\par \par 4.  Hyperlipidemia cholesterol slightly elevated detailed discussion with patient regarding low-fat low-cholesterol diet patient will be starting exercise but slowly unfortunately his shoulder pain is still present and there are multiple exercises that the patient should not be doing to avoid any further injury.\par \par 5.  Patient is doing much better status post joining gym exercise program was restarted I will be obtaining comprehensive blood work at next visit.

## 2022-04-26 NOTE — PHYSICAL EXAM
[No Acute Distress] : no acute distress [Well Nourished] : well nourished [Well Developed] : well developed [Well-Appearing] : well-appearing [Normal Voice/Communication] : normal voice/communication [Normal Sclera/Conjunctiva] : normal sclera/conjunctiva [PERRL] : pupils equal round and reactive to light [EOMI] : extraocular movements intact [Normal Outer Ear/Nose] : the outer ears and nose were normal in appearance [Normal Oropharynx] : the oropharynx was normal [Normal TMs] : both tympanic membranes were normal [Normal Nasal Mucosa] : the nasal mucosa was normal [No JVD] : no jugular venous distention [No Lymphadenopathy] : no lymphadenopathy [Thyroid Normal, No Nodules] : the thyroid was normal and there were no nodules present [No Respiratory Distress] : no respiratory distress  [No Accessory Muscle Use] : no accessory muscle use [Clear to Auscultation] : lungs were clear to auscultation bilaterally [Normal Rate] : normal rate  [Regular Rhythm] : with a regular rhythm [Normal S1, S2] : normal S1 and S2 [No Murmur] : no murmur heard [No Carotid Bruits] : no carotid bruits [No Abdominal Bruit] : a ~M bruit was not heard ~T in the abdomen [No Varicosities] : no varicosities [Pedal Pulses Present] : the pedal pulses are present [No Edema] : there was no peripheral edema [No Palpable Aorta] : no palpable aorta [No Extremity Clubbing/Cyanosis] : no extremity clubbing/cyanosis [Soft] : abdomen soft [Non Tender] : non-tender [Non-distended] : non-distended [No Masses] : no abdominal mass palpated [No HSM] : no HSM [Normal Bowel Sounds] : normal bowel sounds [No Hernias] : no hernias [Urethral Meatus] : meatus normal [Penis Abnormality] : normal circumcised penis [Urinary Bladder Findings] : the bladder was normal on palpation [Scrotum] : the scrotum was normal [Epididymis] : the epididymides were normal [Testes Tenderness] : no tenderness of the testes [Normal Supraclavicular Nodes] : no supraclavicular lymphadenopathy [Normal Axillary Nodes] : no axillary lymphadenopathy [Normal Posterior Cervical Nodes] : no posterior cervical lymphadenopathy [Normal Anterior Cervical Nodes] : no anterior cervical lymphadenopathy [Normal Inguinal Nodes] : no inguinal lymphadenopathy [Normal Femoral Nodes] : no femoral lymphadenopathy [No CVA Tenderness] : no CVA  tenderness [No Spinal Tenderness] : no spinal tenderness [No Joint Swelling] : no joint swelling [Grossly Normal Strength/Tone] : grossly normal strength/tone [No Rash] : no rash [Coordination Grossly Intact] : coordination grossly intact [No Focal Deficits] : no focal deficits [Normal Gait] : normal gait [Deep Tendon Reflexes (DTR)] : deep tendon reflexes were 2+ and symmetric [Speech Grossly Normal] : speech grossly normal [Memory Grossly Normal] : memory grossly normal [Normal Affect] : the affect was normal [Alert and Oriented x3] : oriented to person, place, and time [Normal Mood] : the mood was normal [Normal Insight/Judgement] : insight and judgment were intact [de-identified] : Spasm [de-identified] : Left shoulder range of motion decreased secondary to pain, status post fall work injury torn tendons [de-identified] : Positive Tinel sign, carpal tunnel syndrome, and cubital tunnel syndrome [de-identified] : Anxiety

## 2022-04-26 NOTE — HISTORY OF PRESENT ILLNESS
[FreeTextEntry1] : See HPI. [de-identified] : Patient is a 56-year-old gentleman who presents today for follow-up and disease management.  We will be addressing at today's visit generalized anxiety disorder, chronic pain syndrome unfortunately recently the patient also had a work injury now he has chronic left shoulder pain with paresthesias of the left upper extremity subsequently is being seen by orthopedic surgery and they are considering an epidural injection to help with the chronic pain syndrome.

## 2022-04-26 NOTE — REVIEW OF SYSTEMS
[Joint Pain] : joint pain [Joint Stiffness] : joint stiffness [Muscle Pain] : muscle pain [Muscle Weakness] : no muscle weakness [Back Pain] : back pain [Joint Swelling] : no joint swelling [Suicidal] : not suicidal [Insomnia] : insomnia [Anxiety] : anxiety [Depression] : no depression [Negative] : Heme/Lymph [de-identified] : Paresthesia left arm

## 2022-08-19 ENCOUNTER — APPOINTMENT (OUTPATIENT)
Dept: FAMILY MEDICINE | Facility: CLINIC | Age: 57
End: 2022-08-19

## 2022-08-19 VITALS
RESPIRATION RATE: 18 BRPM | TEMPERATURE: 97.4 F | BODY MASS INDEX: 27 KG/M2 | DIASTOLIC BLOOD PRESSURE: 80 MMHG | WEIGHT: 168 LBS | OXYGEN SATURATION: 100 % | HEIGHT: 66 IN | SYSTOLIC BLOOD PRESSURE: 124 MMHG | HEART RATE: 49 BPM

## 2022-08-19 PROCEDURE — 99214 OFFICE O/P EST MOD 30 MIN: CPT | Mod: 25

## 2022-08-19 PROCEDURE — 36415 COLL VENOUS BLD VENIPUNCTURE: CPT

## 2022-08-19 NOTE — REVIEW OF SYSTEMS
[Joint Pain] : joint pain [Joint Stiffness] : joint stiffness [Muscle Pain] : muscle pain [Back Pain] : back pain [Insomnia] : insomnia [Anxiety] : anxiety [Negative] : Heme/Lymph [Muscle Weakness] : no muscle weakness [Joint Swelling] : no joint swelling [Suicidal] : not suicidal [Depression] : no depression [de-identified] : Paresthesia left arm

## 2022-08-19 NOTE — PHYSICAL EXAM
[No Acute Distress] : no acute distress [Well Nourished] : well nourished [Well Developed] : well developed [Well-Appearing] : well-appearing [Normal Voice/Communication] : normal voice/communication [Normal Sclera/Conjunctiva] : normal sclera/conjunctiva [PERRL] : pupils equal round and reactive to light [EOMI] : extraocular movements intact [Normal Outer Ear/Nose] : the outer ears and nose were normal in appearance [Normal Oropharynx] : the oropharynx was normal [Normal TMs] : both tympanic membranes were normal [Normal Nasal Mucosa] : the nasal mucosa was normal [No JVD] : no jugular venous distention [No Lymphadenopathy] : no lymphadenopathy [Thyroid Normal, No Nodules] : the thyroid was normal and there were no nodules present [No Respiratory Distress] : no respiratory distress  [No Accessory Muscle Use] : no accessory muscle use [Clear to Auscultation] : lungs were clear to auscultation bilaterally [Normal Rate] : normal rate  [Regular Rhythm] : with a regular rhythm [Normal S1, S2] : normal S1 and S2 [No Murmur] : no murmur heard [No Carotid Bruits] : no carotid bruits [No Abdominal Bruit] : a ~M bruit was not heard ~T in the abdomen [No Varicosities] : no varicosities [Pedal Pulses Present] : the pedal pulses are present [No Edema] : there was no peripheral edema [No Palpable Aorta] : no palpable aorta [No Extremity Clubbing/Cyanosis] : no extremity clubbing/cyanosis [Soft] : abdomen soft [Non Tender] : non-tender [Non-distended] : non-distended [No Masses] : no abdominal mass palpated [No HSM] : no HSM [Normal Bowel Sounds] : normal bowel sounds [No Hernias] : no hernias [Urethral Meatus] : meatus normal [Penis Abnormality] : normal circumcised penis [Urinary Bladder Findings] : the bladder was normal on palpation [Scrotum] : the scrotum was normal [Epididymis] : the epididymides were normal [Testes Tenderness] : no tenderness of the testes [Normal Supraclavicular Nodes] : no supraclavicular lymphadenopathy [Normal Axillary Nodes] : no axillary lymphadenopathy [Normal Posterior Cervical Nodes] : no posterior cervical lymphadenopathy [Normal Anterior Cervical Nodes] : no anterior cervical lymphadenopathy [Normal Inguinal Nodes] : no inguinal lymphadenopathy [Normal Femoral Nodes] : no femoral lymphadenopathy [No CVA Tenderness] : no CVA  tenderness [No Spinal Tenderness] : no spinal tenderness [No Joint Swelling] : no joint swelling [Grossly Normal Strength/Tone] : grossly normal strength/tone [No Rash] : no rash [Coordination Grossly Intact] : coordination grossly intact [No Focal Deficits] : no focal deficits [Normal Gait] : normal gait [Deep Tendon Reflexes (DTR)] : deep tendon reflexes were 2+ and symmetric [Speech Grossly Normal] : speech grossly normal [Memory Grossly Normal] : memory grossly normal [Normal Affect] : the affect was normal [Alert and Oriented x3] : oriented to person, place, and time [Normal Mood] : the mood was normal [Normal Insight/Judgement] : insight and judgment were intact [de-identified] : Spasm [de-identified] : Left shoulder range of motion decreased secondary to pain, status post fall work injury torn tendons [de-identified] : Positive Tinel sign, carpal tunnel syndrome, and cubital tunnel syndrome [de-identified] : Anxiety

## 2022-08-19 NOTE — ASSESSMENT
[FreeTextEntry1] : Assessment and plan:\par \par 1.  Generalized anxiety disorder well-controlled with diazepam 5 mg up to twice a day only as needed patient takes medications as prescribed I stop checked there is no sign of abuse.\par \par 2.  Chronic pain syndrome status post work injury he has chronic left shoulder pain we have attempted nonsteroidal anti-inflammatory drugs, muscle relaxants etc. without success patient does take oxycodone 10 mg its prescribed up to 4 times a day as needed I stop checked patient actually is taking less than what is prescribed continue present medical management.\par \par 3.  Hyperlipidemia/hypercholesterolemia discussed with patient low-fat low-cholesterol diet today we will be doing repeat blood work which will be done here in the office and I will draw the blood.\par \par 4.  Hemoglobin A1c was slightly elevated at 5.7 this is not a critical level but the patient is extremely worried I discussed no concentrated sweets consistent carbohydrate diet which she has been doing we will check hemoglobin A1c. TM EFFUSION/DULL/ABSENT LIGHT REFLEX

## 2022-08-19 NOTE — HEALTH RISK ASSESSMENT
[Former] : Former [20 or more] : 20 or more [Yes] : Yes [2 - 3 times a week (3 pts)] : 2 - 3  times a week (3 points) [1 or 2 (0 pts)] : 1 or 2 (0 points) [Never (0 pts)] : Never (0 points) [No] : In the past 12 months have you used drugs other than those required for medical reasons? No [Any fall with injury in past year] : Patient reported fall with injury in the past year [0] : 2) Feeling down, depressed, or hopeless: Not at all (0) [YearQuit] : 2007 [Audit-CScore] : 3 [[id number]] : 0

## 2022-08-19 NOTE — HISTORY OF PRESENT ILLNESS
[FreeTextEntry1] : Please see HPI. [de-identified] : Patient is a 56-year-old gentleman who presents today for follow-up and disease management patient states that he is in his usual state of health medical history is significant for generalized anxiety presently well controlled with diazepam, chronic pain syndrome secondary to work injury chronic left shoulder pain, hyperlipidemia, hyperglycemia slightly elevated hemoglobin A1c.  Patient has been following a low-fat low-cholesterol no concentrated sweets consistent carbohydrate diet at today's visit I will be obtaining comprehensive blood work.

## 2022-08-22 LAB
ALBUMIN SERPL ELPH-MCNC: 4.7 G/DL
ALP BLD-CCNC: 69 U/L
ALT SERPL-CCNC: 24 U/L
ANION GAP SERPL CALC-SCNC: 14 MMOL/L
AST SERPL-CCNC: 22 U/L
BASOPHILS # BLD AUTO: 0.03 K/UL
BASOPHILS NFR BLD AUTO: 0.4 %
BILIRUB SERPL-MCNC: 1 MG/DL
BUN SERPL-MCNC: 14 MG/DL
CALCIUM SERPL-MCNC: 9.6 MG/DL
CHLORIDE SERPL-SCNC: 101 MMOL/L
CHOLEST SERPL-MCNC: 194 MG/DL
CO2 SERPL-SCNC: 25 MMOL/L
CREAT SERPL-MCNC: 0.99 MG/DL
EGFR: 91 ML/MIN/1.73M2
EOSINOPHIL # BLD AUTO: 0.05 K/UL
EOSINOPHIL NFR BLD AUTO: 0.7 %
ESTIMATED AVERAGE GLUCOSE: 117 MG/DL
GLUCOSE SERPL-MCNC: 102 MG/DL
HBA1C MFR BLD HPLC: 5.7 %
HCT VFR BLD CALC: 49.1 %
HDLC SERPL-MCNC: 73 MG/DL
HGB BLD-MCNC: 16 G/DL
IMM GRANULOCYTES NFR BLD AUTO: 0.3 %
LDLC SERPL CALC-MCNC: 106 MG/DL
LYMPHOCYTES # BLD AUTO: 2.47 K/UL
LYMPHOCYTES NFR BLD AUTO: 33.7 %
MAN DIFF?: NORMAL
MCHC RBC-ENTMCNC: 29.5 PG
MCHC RBC-ENTMCNC: 32.6 GM/DL
MCV RBC AUTO: 90.6 FL
MONOCYTES # BLD AUTO: 0.69 K/UL
MONOCYTES NFR BLD AUTO: 9.4 %
NEUTROPHILS # BLD AUTO: 4.07 K/UL
NEUTROPHILS NFR BLD AUTO: 55.5 %
NONHDLC SERPL-MCNC: 121 MG/DL
PLATELET # BLD AUTO: 296 K/UL
POTASSIUM SERPL-SCNC: 4.2 MMOL/L
PROT SERPL-MCNC: 7.3 G/DL
RBC # BLD: 5.42 M/UL
RBC # FLD: 13.8 %
SODIUM SERPL-SCNC: 140 MMOL/L
TRIGL SERPL-MCNC: 73 MG/DL
WBC # FLD AUTO: 7.33 K/UL

## 2022-10-13 ENCOUNTER — APPOINTMENT (OUTPATIENT)
Dept: FAMILY MEDICINE | Facility: CLINIC | Age: 57
End: 2022-10-13

## 2022-10-13 VITALS
WEIGHT: 170 LBS | HEIGHT: 66 IN | OXYGEN SATURATION: 96 % | DIASTOLIC BLOOD PRESSURE: 82 MMHG | TEMPERATURE: 98 F | BODY MASS INDEX: 27.32 KG/M2 | SYSTOLIC BLOOD PRESSURE: 122 MMHG | HEART RATE: 70 BPM | RESPIRATION RATE: 16 BRPM

## 2022-10-13 DIAGNOSIS — H00.011 HORDEOLUM EXTERNUM RIGHT UPPER EYELID: ICD-10-CM

## 2022-10-13 PROCEDURE — 99214 OFFICE O/P EST MOD 30 MIN: CPT | Mod: 25

## 2022-10-13 PROCEDURE — G0008: CPT

## 2022-10-13 PROCEDURE — 90686 IIV4 VACC NO PRSV 0.5 ML IM: CPT

## 2022-10-13 NOTE — HISTORY OF PRESENT ILLNESS
[FreeTextEntry1] : See HPI. [de-identified] : Patient is a 57-year-old gentleman who presents today for follow-up and disease management.  Patient states that he is in his usual state of health with underlying general anxiety disorder presently well controlled, chronic pain syndrome, chronic left shoulder pain, elevated hemoglobin A1c mildly elevated at 5.7 normal is 5.6 and hyperlipidemia.\par \par Presently the patient is awake alert and oriented x3 in no acute distress calm and cooperative.

## 2022-10-13 NOTE — PHYSICAL EXAM
[No Acute Distress] : no acute distress [Well Nourished] : well nourished [Well Developed] : well developed [Well-Appearing] : well-appearing [Normal Voice/Communication] : normal voice/communication [Normal Sclera/Conjunctiva] : normal sclera/conjunctiva [PERRL] : pupils equal round and reactive to light [EOMI] : extraocular movements intact [Normal Outer Ear/Nose] : the outer ears and nose were normal in appearance [Normal Oropharynx] : the oropharynx was normal [Normal TMs] : both tympanic membranes were normal [Normal Nasal Mucosa] : the nasal mucosa was normal [No JVD] : no jugular venous distention [No Lymphadenopathy] : no lymphadenopathy [Thyroid Normal, No Nodules] : the thyroid was normal and there were no nodules present [No Respiratory Distress] : no respiratory distress  [No Accessory Muscle Use] : no accessory muscle use [Clear to Auscultation] : lungs were clear to auscultation bilaterally [Normal Rate] : normal rate  [Regular Rhythm] : with a regular rhythm [Normal S1, S2] : normal S1 and S2 [No Murmur] : no murmur heard [No Carotid Bruits] : no carotid bruits [No Abdominal Bruit] : a ~M bruit was not heard ~T in the abdomen [No Varicosities] : no varicosities [Pedal Pulses Present] : the pedal pulses are present [No Edema] : there was no peripheral edema [No Palpable Aorta] : no palpable aorta [No Extremity Clubbing/Cyanosis] : no extremity clubbing/cyanosis [Soft] : abdomen soft [Non Tender] : non-tender [Non-distended] : non-distended [No Masses] : no abdominal mass palpated [No HSM] : no HSM [Normal Bowel Sounds] : normal bowel sounds [No Hernias] : no hernias [Urethral Meatus] : meatus normal [Penis Abnormality] : normal circumcised penis [Urinary Bladder Findings] : the bladder was normal on palpation [Scrotum] : the scrotum was normal [Epididymis] : the epididymides were normal [Testes Tenderness] : no tenderness of the testes [Normal Supraclavicular Nodes] : no supraclavicular lymphadenopathy [Normal Axillary Nodes] : no axillary lymphadenopathy [Normal Posterior Cervical Nodes] : no posterior cervical lymphadenopathy [Normal Anterior Cervical Nodes] : no anterior cervical lymphadenopathy [Normal Inguinal Nodes] : no inguinal lymphadenopathy [Normal Femoral Nodes] : no femoral lymphadenopathy [No CVA Tenderness] : no CVA  tenderness [No Spinal Tenderness] : no spinal tenderness [No Joint Swelling] : no joint swelling [Grossly Normal Strength/Tone] : grossly normal strength/tone [No Rash] : no rash [Coordination Grossly Intact] : coordination grossly intact [No Focal Deficits] : no focal deficits [Normal Gait] : normal gait [Deep Tendon Reflexes (DTR)] : deep tendon reflexes were 2+ and symmetric [Speech Grossly Normal] : speech grossly normal [Memory Grossly Normal] : memory grossly normal [Normal Affect] : the affect was normal [Alert and Oriented x3] : oriented to person, place, and time [Normal Mood] : the mood was normal [Normal Insight/Judgement] : insight and judgment were intact [de-identified] : Stye right eye upper lid [de-identified] : Spasm [de-identified] : Left shoulder range of motion decreased secondary to pain, status post fall work injury torn tendons [de-identified] : Positive Tinel sign, carpal tunnel syndrome, and cubital tunnel syndrome [de-identified] : Anxiety

## 2022-10-13 NOTE — ASSESSMENT
[FreeTextEntry1] : Assessment and plan:\par \par 1.  Generalized anxiety disorder well-controlled with diazepam 5 mg up to twice a day only as needed patient takes medications as prescribed I stop checked there is no sign of abuse.\par \par 2.  Chronic pain syndrome status post work injury he has chronic left shoulder pain we have attempted nonsteroidal anti-inflammatory drugs, muscle relaxants etc. without success patient does take oxycodone 10 mg its prescribed up to 4 times a day as needed I stop checked patient actually is taking less than what is prescribed continue present medical management.\par \par 3.  Hyperlipidemia/hypercholesterolemia discussed with patient low-fat low-cholesterol diet .\par \par 4.  Hemoglobin A1c was slightly elevated at 5.7 this is not a critical level but the patient is extremely worried I discussed no concentrated sweets consistent carbohydrate diet which she has been doing we will check hemoglobin A1c.\par \par 5.  Stye right upper lid continue warm packs on a regular basis and seeing that conservative management has not worked we will attempt tobramycin prescription sent to pharmacy.\par \par 6.  I reviewed with patient his most recent blood work multiple questions which were answered to patient's satisfaction.\par \par 7.  Fluzone quadrivalent influenza vaccine administered left deltoid.

## 2022-10-13 NOTE — REVIEW OF SYSTEMS
[Joint Pain] : joint pain [Joint Stiffness] : joint stiffness [Muscle Pain] : muscle pain [Back Pain] : back pain [Insomnia] : insomnia [Anxiety] : anxiety [Negative] : Heme/Lymph [Muscle Weakness] : no muscle weakness [Joint Swelling] : no joint swelling [Suicidal] : not suicidal [Depression] : no depression [de-identified] : Paresthesia left arm

## 2022-10-13 NOTE — HEALTH RISK ASSESSMENT
[Former] : Former [20 or more] : 20 or more [Yes] : Yes [2 - 3 times a week (3 pts)] : 2 - 3  times a week (3 points) [1 or 2 (0 pts)] : 1 or 2 (0 points) [Never (0 pts)] : Never (0 points) [No] : In the past 12 months have you used drugs other than those required for medical reasons? No [Any fall with injury in past year] : Patient reported fall with injury in the past year [0] : 2) Feeling down, depressed, or hopeless: Not at all (0) [PHQ-2 Negative - No further assessment needed] : PHQ-2 Negative - No further assessment needed [YearQuit] : 2007 [Audit-CScore] : 3 [[id number]] : 0

## 2022-12-29 ENCOUNTER — TRANSCRIPTION ENCOUNTER (OUTPATIENT)
Age: 57
End: 2022-12-29

## 2022-12-30 ENCOUNTER — NON-APPOINTMENT (OUTPATIENT)
Age: 57
End: 2022-12-30

## 2023-01-18 ENCOUNTER — NON-APPOINTMENT (OUTPATIENT)
Age: 58
End: 2023-01-18

## 2023-01-18 ENCOUNTER — APPOINTMENT (OUTPATIENT)
Dept: FAMILY MEDICINE | Facility: CLINIC | Age: 58
End: 2023-01-18
Payer: COMMERCIAL

## 2023-01-18 VITALS
RESPIRATION RATE: 16 BRPM | WEIGHT: 173 LBS | HEART RATE: 94 BPM | TEMPERATURE: 97.9 F | OXYGEN SATURATION: 98 % | SYSTOLIC BLOOD PRESSURE: 109 MMHG | HEIGHT: 66 IN | DIASTOLIC BLOOD PRESSURE: 70 MMHG | BODY MASS INDEX: 27.8 KG/M2

## 2023-01-18 PROCEDURE — 99214 OFFICE O/P EST MOD 30 MIN: CPT

## 2023-01-18 NOTE — REVIEW OF SYSTEMS
[Joint Pain] : joint pain [Joint Stiffness] : joint stiffness [Muscle Pain] : muscle pain [Muscle Weakness] : no muscle weakness [Back Pain] : back pain [Joint Swelling] : no joint swelling [Suicidal] : not suicidal [Insomnia] : insomnia [Anxiety] : anxiety [Depression] : no depression [Negative] : Heme/Lymph [de-identified] : Paresthesia left arm

## 2023-01-18 NOTE — ASSESSMENT
[FreeTextEntry1] : Assessment and plan:\par \par 1.  Status post upper respiratory tract infection involving also his lower respiratory tract patient had a bronchitis was seen in urgent care on December 26 subsequently patient was treated was out of work from December 26 to December 30 presently all symptoms have subsided there is no contraindication for the patient to return to work patient return to work on January 3, 2023.\par \par 2.  Generalized anxiety disorder well-controlled with diazepam 5 mg up to twice a day only as needed patient takes medications as prescribed I stop checked there is no sign of abuse.\par \par 3.  Chronic pain syndrome status post work injury he has chronic left shoulder pain we have attempted nonsteroidal anti-inflammatory drugs, muscle relaxants etc. without success patient does take oxycodone 10 mg its prescribed up to 4 times a day as needed I stop checked patient actually is taking less than what is prescribed continue present medical management.\par \par 4.  Hyperlipidemia/hypercholesterolemia discussed with patient low-fat low-cholesterol diet .\par \par 5.  Hemoglobin A1c was slightly elevated at 5.7 this is not a critical level but the patient is extremely worried I discussed no concentrated sweets consistent carbohydrate diet.\par \par 6.  Stye right upper lid continue warm packs on a regular basis and seeing that conservative management has not worked we will attempt tobramycin prescription sent to pharmacy.\par \par

## 2023-01-18 NOTE — HEALTH RISK ASSESSMENT
[Former] : Former [20 or more] : 20 or more [Yes] : Yes [2 - 3 times a week (3 pts)] : 2 - 3  times a week (3 points) [1 or 2 (0 pts)] : 1 or 2 (0 points) [Never (0 pts)] : Never (0 points) [No] : In the past 12 months have you used drugs other than those required for medical reasons? No [No falls in past year] : Patient reported no falls in the past year [0] : 2) Feeling down, depressed, or hopeless: Not at all (0) [PHQ-2 Negative - No further assessment needed] : PHQ-2 Negative - No further assessment needed [YearQuit] : 2007 [Audit-CScore] : 3 [[id number]] : 0

## 2023-01-18 NOTE — PHYSICAL EXAM
[No Acute Distress] : no acute distress [Well Nourished] : well nourished [Well Developed] : well developed [Well-Appearing] : well-appearing [Normal Voice/Communication] : normal voice/communication [Normal Sclera/Conjunctiva] : normal sclera/conjunctiva [PERRL] : pupils equal round and reactive to light [EOMI] : extraocular movements intact [Normal Outer Ear/Nose] : the outer ears and nose were normal in appearance [Normal Oropharynx] : the oropharynx was normal [Normal TMs] : both tympanic membranes were normal [Normal Nasal Mucosa] : the nasal mucosa was normal [No JVD] : no jugular venous distention [No Lymphadenopathy] : no lymphadenopathy [Thyroid Normal, No Nodules] : the thyroid was normal and there were no nodules present [No Respiratory Distress] : no respiratory distress  [No Accessory Muscle Use] : no accessory muscle use [Clear to Auscultation] : lungs were clear to auscultation bilaterally [Normal Rate] : normal rate  [Regular Rhythm] : with a regular rhythm [Normal S1, S2] : normal S1 and S2 [No Murmur] : no murmur heard [No Carotid Bruits] : no carotid bruits [No Abdominal Bruit] : a ~M bruit was not heard ~T in the abdomen [No Varicosities] : no varicosities [Pedal Pulses Present] : the pedal pulses are present [No Edema] : there was no peripheral edema [No Palpable Aorta] : no palpable aorta [No Extremity Clubbing/Cyanosis] : no extremity clubbing/cyanosis [Soft] : abdomen soft [Non Tender] : non-tender [Non-distended] : non-distended [No Masses] : no abdominal mass palpated [No HSM] : no HSM [Normal Bowel Sounds] : normal bowel sounds [No Hernias] : no hernias [Normal Supraclavicular Nodes] : no supraclavicular lymphadenopathy [Normal Axillary Nodes] : no axillary lymphadenopathy [Normal Posterior Cervical Nodes] : no posterior cervical lymphadenopathy [Normal Anterior Cervical Nodes] : no anterior cervical lymphadenopathy [Normal Inguinal Nodes] : no inguinal lymphadenopathy [Normal Femoral Nodes] : no femoral lymphadenopathy [No CVA Tenderness] : no CVA  tenderness [No Spinal Tenderness] : no spinal tenderness [No Joint Swelling] : no joint swelling [Grossly Normal Strength/Tone] : grossly normal strength/tone [No Rash] : no rash [Coordination Grossly Intact] : coordination grossly intact [No Focal Deficits] : no focal deficits [Normal Gait] : normal gait [Deep Tendon Reflexes (DTR)] : deep tendon reflexes were 2+ and symmetric [Speech Grossly Normal] : speech grossly normal [Memory Grossly Normal] : memory grossly normal [Normal Affect] : the affect was normal [Alert and Oriented x3] : oriented to person, place, and time [Normal Mood] : the mood was normal [Normal Insight/Judgement] : insight and judgment were intact [de-identified] : Stye right eye upper lid [de-identified] : Left shoulder range of motion decreased secondary to pain, status post fall work injury torn tendons [de-identified] : Positive Tinel sign, carpal tunnel syndrome, and cubital tunnel syndrome [de-identified] : Anxiety

## 2023-01-18 NOTE — HISTORY OF PRESENT ILLNESS
[FreeTextEntry1] : See HPI. [de-identified] : Patient is a 57-year-old gentleman who presents today for follow-up and disease management.  Patient states that he is in his usual state of health with underlying general anxiety disorder presently well controlled, chronic pain syndrome, chronic left shoulder pain, elevated hemoglobin A1c mildly elevated at 5.7 normal is 5.6 and hyperlipidemia.  Patient is status post upper respiratory tract infection which has resolved.\par \par Presently the patient is awake alert and oriented x3 in no acute distress calm and cooperative.

## 2023-04-14 ENCOUNTER — APPOINTMENT (OUTPATIENT)
Dept: FAMILY MEDICINE | Facility: CLINIC | Age: 58
End: 2023-04-14
Payer: COMMERCIAL

## 2023-04-14 VITALS
WEIGHT: 176 LBS | OXYGEN SATURATION: 97 % | SYSTOLIC BLOOD PRESSURE: 120 MMHG | BODY MASS INDEX: 28.28 KG/M2 | RESPIRATION RATE: 15 BRPM | HEIGHT: 66 IN | TEMPERATURE: 97.7 F | HEART RATE: 104 BPM | DIASTOLIC BLOOD PRESSURE: 80 MMHG

## 2023-04-14 DIAGNOSIS — M25.512 PAIN IN LEFT SHOULDER: ICD-10-CM

## 2023-04-14 DIAGNOSIS — R73.09 OTHER ABNORMAL GLUCOSE: ICD-10-CM

## 2023-04-14 DIAGNOSIS — G89.29 PAIN IN LEFT SHOULDER: ICD-10-CM

## 2023-04-14 PROCEDURE — 99214 OFFICE O/P EST MOD 30 MIN: CPT

## 2023-04-14 NOTE — PHYSICAL EXAM
[No Acute Distress] : no acute distress [Well Nourished] : well nourished [Well Developed] : well developed [Well-Appearing] : well-appearing [Normal Voice/Communication] : normal voice/communication [Normal Sclera/Conjunctiva] : normal sclera/conjunctiva [PERRL] : pupils equal round and reactive to light [EOMI] : extraocular movements intact [Normal Outer Ear/Nose] : the outer ears and nose were normal in appearance [Normal Oropharynx] : the oropharynx was normal [Normal TMs] : both tympanic membranes were normal [Normal Nasal Mucosa] : the nasal mucosa was normal [No JVD] : no jugular venous distention [No Lymphadenopathy] : no lymphadenopathy [Thyroid Normal, No Nodules] : the thyroid was normal and there were no nodules present [No Respiratory Distress] : no respiratory distress  [No Accessory Muscle Use] : no accessory muscle use [Clear to Auscultation] : lungs were clear to auscultation bilaterally [Normal Rate] : normal rate  [Regular Rhythm] : with a regular rhythm [Normal S1, S2] : normal S1 and S2 [No Murmur] : no murmur heard [No Carotid Bruits] : no carotid bruits [No Abdominal Bruit] : a ~M bruit was not heard ~T in the abdomen [No Varicosities] : no varicosities [Pedal Pulses Present] : the pedal pulses are present [No Edema] : there was no peripheral edema [No Palpable Aorta] : no palpable aorta [No Extremity Clubbing/Cyanosis] : no extremity clubbing/cyanosis [Soft] : abdomen soft [Non Tender] : non-tender [Non-distended] : non-distended [No Masses] : no abdominal mass palpated [No HSM] : no HSM [Normal Bowel Sounds] : normal bowel sounds [No Hernias] : no hernias [No CVA Tenderness] : no CVA  tenderness [No Spinal Tenderness] : no spinal tenderness [No Joint Swelling] : no joint swelling [Grossly Normal Strength/Tone] : grossly normal strength/tone [No Rash] : no rash [Coordination Grossly Intact] : coordination grossly intact [No Focal Deficits] : no focal deficits [Normal Gait] : normal gait [Deep Tendon Reflexes (DTR)] : deep tendon reflexes were 2+ and symmetric [Speech Grossly Normal] : speech grossly normal [Memory Grossly Normal] : memory grossly normal [Normal Affect] : the affect was normal [Alert and Oriented x3] : oriented to person, place, and time [Normal Mood] : the mood was normal [Normal Insight/Judgement] : insight and judgment were intact [de-identified] : Left shoulder range of motion decreased secondary to pain, status post fall work injury torn tendons [de-identified] : Positive Tinel sign, carpal tunnel syndrome, and cubital tunnel syndrome [de-identified] : Anxiety

## 2023-04-14 NOTE — ASSESSMENT
[FreeTextEntry1] : Assessment and plan:\par \par 1.  Detailed discussion with patient regarding health maintenance issues patient does have a positive family history of colon cancer patient did have a colonoscopy done 5 years ago which showed a mild colitis subsequently will be following up for colonoscopy.  Patient was wondering if Cologuard would be a good test for him unfortunately he is considered high risk therefore I recommend colonoscopy over Cologuard.  Patient will be making appointment.\par \par 2.  Generalized anxiety disorder well-controlled with diazepam 5 mg up to twice a day only as needed patient takes medications as prescribed I stop checked there is no sign of abuse.\par \par 3.  Chronic pain syndrome status post work injury he has chronic left shoulder pain we have attempted nonsteroidal anti-inflammatory drugs, muscle relaxants etc. without success patient does take oxycodone 10 mg its prescribed up to 4 times a day as needed I stop checked patient actually is taking less than what is prescribed continue present medical management.\par \par 4.  Hyperlipidemia/hypercholesterolemia discussed with patient low-fat low-cholesterol diet .\par \par 5.  Hemoglobin A1c was slightly elevated at 5.7 this is not a critical level but the patient is extremely worried I discussed no concentrated sweets consistent carbohydrate diet.\par \par \par

## 2023-04-14 NOTE — REVIEW OF SYSTEMS
[Joint Pain] : joint pain [Joint Stiffness] : joint stiffness [Muscle Pain] : muscle pain [Muscle Weakness] : no muscle weakness [Back Pain] : back pain [Joint Swelling] : no joint swelling [Suicidal] : not suicidal [Insomnia] : insomnia [Anxiety] : anxiety [Depression] : no depression [Negative] : Heme/Lymph [de-identified] : Paresthesia left arm

## 2023-04-14 NOTE — HISTORY OF PRESENT ILLNESS
[FreeTextEntry1] : See HPI. [de-identified] : Patient is a 57-year-old gentleman who presents today for follow-up and disease management.  Patient states that he is in his usual state of health with underlying general anxiety disorder presently well controlled, chronic pain syndrome, chronic left shoulder pain, elevated hemoglobin A1c mildly elevated at 5.7 normal is 5.6 and hyperlipidemia.  Patient is status post upper respiratory tract infection which has resolved.\par \par Presently the patient is awake alert and oriented x3 in no acute distress calm and cooperative.

## 2023-04-14 NOTE — HEALTH RISK ASSESSMENT
[Yes] : Yes [2 - 3 times a week (3 pts)] : 2 - 3  times a week (3 points) [1 or 2 (0 pts)] : 1 or 2 (0 points) [Never (0 pts)] : Never (0 points) [No] : In the past 12 months have you used drugs other than those required for medical reasons? No [No falls in past year] : Patient reported no falls in the past year [0] : 2) Feeling down, depressed, or hopeless: Not at all (0) [PHQ-2 Negative - No further assessment needed] : PHQ-2 Negative - No further assessment needed [Audit-CScore] : 3 [[id number]] : 0 [Former] : Former [20 or more] : 20 or more [> 15 Years] : > 15 Years

## 2023-07-26 ENCOUNTER — APPOINTMENT (OUTPATIENT)
Dept: FAMILY MEDICINE | Facility: CLINIC | Age: 58
End: 2023-07-26
Payer: COMMERCIAL

## 2023-07-26 ENCOUNTER — NON-APPOINTMENT (OUTPATIENT)
Age: 58
End: 2023-07-26

## 2023-07-26 VITALS
HEIGHT: 66 IN | WEIGHT: 174 LBS | SYSTOLIC BLOOD PRESSURE: 118 MMHG | RESPIRATION RATE: 16 BRPM | DIASTOLIC BLOOD PRESSURE: 80 MMHG | HEART RATE: 80 BPM | BODY MASS INDEX: 27.97 KG/M2 | TEMPERATURE: 96.5 F | OXYGEN SATURATION: 99 %

## 2023-07-26 DIAGNOSIS — R35.0 FREQUENCY OF MICTURITION: ICD-10-CM

## 2023-07-26 DIAGNOSIS — E78.1 PURE HYPERGLYCERIDEMIA: ICD-10-CM

## 2023-07-26 DIAGNOSIS — R00.2 PALPITATIONS: ICD-10-CM

## 2023-07-26 DIAGNOSIS — Z00.00 ENCOUNTER FOR GENERAL ADULT MEDICAL EXAMINATION W/OUT ABNORMAL FINDINGS: ICD-10-CM

## 2023-07-26 PROCEDURE — 99396 PREV VISIT EST AGE 40-64: CPT | Mod: 25

## 2023-07-26 PROCEDURE — 93000 ELECTROCARDIOGRAM COMPLETE: CPT

## 2023-07-26 PROCEDURE — 36415 COLL VENOUS BLD VENIPUNCTURE: CPT

## 2023-07-26 NOTE — HISTORY OF PRESENT ILLNESS
[FreeTextEntry1] : Annual wellness exam [de-identified] : Patient is a 57-year-old gentleman who presents today for annual wellness exam medical history significant for anxiety, chronic pain syndrome, hypertriglyceridemia and occasional palpitations.  Patient states that he is in his usual state of health states that he has had an extremely tough several days at work especially with the heat and sweating.  Presently the patient denies any chest pain, shortness of breath, nausea or vomiting he denies any neck arm or jaw discomfort.

## 2023-07-26 NOTE — REVIEW OF SYSTEMS
[Joint Pain] : joint pain [Joint Stiffness] : joint stiffness [Muscle Pain] : muscle pain [Back Pain] : back pain [Insomnia] : insomnia [Anxiety] : anxiety [Negative] : Heme/Lymph [Muscle Weakness] : no muscle weakness [Joint Swelling] : no joint swelling [Suicidal] : not suicidal [Depression] : no depression [de-identified] : Paresthesia left arm

## 2023-07-26 NOTE — ASSESSMENT
[FreeTextEntry1] : Assessment and plan:\par \par 1.  Detailed discussion with patient regarding health maintenance issues patient does have a positive family history of colon cancer patient did have a colonoscopy done 5 years ago which showed a mild colitis subsequently will be following up for colonoscopy.  Patient was wondering if Cologuard would be a good test for him unfortunately he is considered high risk therefore I recommend colonoscopy over Cologuard.  Patient will be making appointment.\par \par 2.  Generalized anxiety disorder well-controlled with diazepam 5 mg up to twice a day only as needed patient takes medications as prescribed I stop checked there is no sign of abuse.\par \par 3.  Chronic pain syndrome status post work injury he has chronic left shoulder pain we have attempted nonsteroidal anti-inflammatory drugs, muscle relaxants etc. without success patient does take oxycodone 10 mg its prescribed up to 4 times a day as needed I stop checked patient actually is taking less than what is prescribed continue present medical management.\par \par 4.  Hyperlipidemia/hypercholesterolemia discussed with patient low-fat low-cholesterol diet .\par \par 5.  Hemoglobin A1c was slightly elevated at 5.7 this is not a critical level but the patient is extremely worried I discussed no concentrated sweets consistent carbohydrate diet.\par \par 6.  On physical exam patient's heart rhythm was irregularly irregular rate was well controlled electrocardiogram shows that the patient is in atrial fibrillation.  Call placed to cardiology patient will be seen tomorrow by cardiology I did not start the patient on anticoagulation he is totally asymptomatic and this is the first time that his electrocardiogram was abnormal.  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  Comprehensive blood work was drawn in office and patient was instructed that if he develops any type of chest pain palpitations or anything of that nature he needs to go directly to the emergency room.\par \par \par

## 2023-07-26 NOTE — COUNSELING
[Fall prevention counseling provided] : Fall prevention counseling provided [Adequate lighting] : Adequate lighting [No throw rugs] : No throw rugs [Behavioral health counseling provided] : Behavioral health counseling provided [Use proper foot wear] : Use proper foot wear [Sleep ___ hours/day] : Sleep [unfilled] hours/day [Engage in a relaxing activity] : Engage in a relaxing activity [Plan in advance] : Plan in advance [AUDIT-C Screening administered and reviewed] : AUDIT-C Screening administered and reviewed [None] : None [Good understanding] : Patient has a good understanding of lifestyle changes and steps needed to achieve self management goal

## 2023-07-26 NOTE — PHYSICAL EXAM
[No Acute Distress] : no acute distress [Well Nourished] : well nourished [Well Developed] : well developed [Well-Appearing] : well-appearing [Normal Voice/Communication] : normal voice/communication [Normal Sclera/Conjunctiva] : normal sclera/conjunctiva [PERRL] : pupils equal round and reactive to light [EOMI] : extraocular movements intact [Normal Outer Ear/Nose] : the outer ears and nose were normal in appearance [Normal Oropharynx] : the oropharynx was normal [Normal TMs] : both tympanic membranes were normal [Normal Nasal Mucosa] : the nasal mucosa was normal [No JVD] : no jugular venous distention [No Lymphadenopathy] : no lymphadenopathy [Thyroid Normal, No Nodules] : the thyroid was normal and there were no nodules present [No Respiratory Distress] : no respiratory distress  [No Accessory Muscle Use] : no accessory muscle use [Clear to Auscultation] : lungs were clear to auscultation bilaterally [Normal Rate] : normal rate  [Normal S1, S2] : normal S1 and S2 [No Murmur] : no murmur heard [No Carotid Bruits] : no carotid bruits [No Abdominal Bruit] : a ~M bruit was not heard ~T in the abdomen [No Varicosities] : no varicosities [Pedal Pulses Present] : the pedal pulses are present [No Edema] : there was no peripheral edema [No Palpable Aorta] : no palpable aorta [No Extremity Clubbing/Cyanosis] : no extremity clubbing/cyanosis [Soft] : abdomen soft [Non Tender] : non-tender [Non-distended] : non-distended [No Masses] : no abdominal mass palpated [No HSM] : no HSM [Normal Bowel Sounds] : normal bowel sounds [No Hernias] : no hernias [No Spinal Tenderness] : no spinal tenderness [No CVA Tenderness] : no CVA  tenderness [No Joint Swelling] : no joint swelling [Grossly Normal Strength/Tone] : grossly normal strength/tone [No Rash] : no rash [Coordination Grossly Intact] : coordination grossly intact [No Focal Deficits] : no focal deficits [Normal Gait] : normal gait [Deep Tendon Reflexes (DTR)] : deep tendon reflexes were 2+ and symmetric [Speech Grossly Normal] : speech grossly normal [Memory Grossly Normal] : memory grossly normal [Normal Affect] : the affect was normal [Alert and Oriented x3] : oriented to person, place, and time [Normal Mood] : the mood was normal [Normal Insight/Judgement] : insight and judgment were intact [de-identified] : Irregularly irregular [de-identified] : Left shoulder range of motion decreased secondary to pain, status post fall work injury torn tendons [de-identified] : Positive Tinel sign, carpal tunnel syndrome, and cubital tunnel syndrome [de-identified] : Anxiety

## 2023-07-26 NOTE — HEALTH RISK ASSESSMENT
[Good] : ~his/her~  mood as  good [Yes] : Yes [2 - 3 times a week (3 pts)] : 2 - 3  times a week (3 points) [1 or 2 (0 pts)] : 1 or 2 (0 points) [Never (0 pts)] : Never (0 points) [No] : In the past 12 months have you used drugs other than those required for medical reasons? No [No falls in past year] : Patient reported no falls in the past year [0] : 2) Feeling down, depressed, or hopeless: Not at all (0) [PHQ-2 Negative - No further assessment needed] : PHQ-2 Negative - No further assessment needed [HIV test declined] : HIV test declined [Hepatitis C test offered] : Hepatitis C test offered [None] : None [Alone] : lives alone [Employed] : employed [High School] : high school [Feels Safe at Home] : Feels safe at home [Fully functional (bathing, dressing, toileting, transferring, walking, feeding)] : Fully functional (bathing, dressing, toileting, transferring, walking, feeding) [Fully functional (using the telephone, shopping, preparing meals, housekeeping, doing laundry, using] : Fully functional and needs no help or supervision to perform IADLs (using the telephone, shopping, preparing meals, housekeeping, doing laundry, using transportation, managing medications and managing finances) [Reports normal functional visual acuity (ie: able to read med bottle)] : Reports normal functional visual acuity [Smoke Detector] : smoke detector [Carbon Monoxide Detector] : carbon monoxide detector [Safety elements used in home] : safety elements used in home [Seat Belt] :  uses seat belt [Sunscreen] : uses sunscreen [With Patient/Caregiver] : , with patient/caregiver [Reviewed no changes] : Reviewed, no changes [I will adhere to the patient's wishes.] : I will adhere to the patient's wishes. [Former] : Former [20 or more] : 20 or more [> 15 Years] : > 15 Years [Audit-CScore] : 3 [[id number]] : 0 [Change in mental status noted] : No change in mental status noted [Language] : denies difficulty with language [Behavior] : denies difficulty with behavior [Learning/Retaining New Information] : denies difficulty learning/retaining new information [Handling Complex Tasks] : denies difficulty handling complex tasks [Reasoning] : denies difficulty with reasoning [Spatial Ability and Orientation] : denies difficulty with spatial ability and orientation [Reports changes in hearing] : Reports no changes in hearing [Reports changes in vision] : Reports no changes in vision [Reports changes in dental health] : Reports no changes in dental health [Guns at Home] : no guns at home [Travel to Developing Areas] : does not  travel to developing areas [TB Exposure] : is not being exposed to tuberculosis [Caregiver Concerns] : does not have caregiver concerns [FreeTextEntry2] :  [AdvancecareDate] : 07/23

## 2023-07-27 ENCOUNTER — NON-APPOINTMENT (OUTPATIENT)
Age: 58
End: 2023-07-27

## 2023-07-27 ENCOUNTER — APPOINTMENT (OUTPATIENT)
Dept: CARDIOLOGY | Facility: CLINIC | Age: 58
End: 2023-07-27
Payer: COMMERCIAL

## 2023-07-27 VITALS
DIASTOLIC BLOOD PRESSURE: 84 MMHG | HEIGHT: 66 IN | HEART RATE: 92 BPM | BODY MASS INDEX: 27.64 KG/M2 | WEIGHT: 172 LBS | SYSTOLIC BLOOD PRESSURE: 122 MMHG | OXYGEN SATURATION: 99 %

## 2023-07-27 LAB
ALBUMIN SERPL ELPH-MCNC: 4.6 G/DL
ALP BLD-CCNC: 62 U/L
ALT SERPL-CCNC: 21 U/L
ANION GAP SERPL CALC-SCNC: 14 MMOL/L
APPEARANCE: CLEAR
AST SERPL-CCNC: 22 U/L
BACTERIA: NEGATIVE /HPF
BILIRUB SERPL-MCNC: 0.2 MG/DL
BILIRUBIN URINE: NEGATIVE
BLOOD URINE: NEGATIVE
BUN SERPL-MCNC: 20 MG/DL
CALCIUM SERPL-MCNC: 9.3 MG/DL
CAST: 0 /LPF
CHLORIDE SERPL-SCNC: 103 MMOL/L
CHOLEST SERPL-MCNC: 166 MG/DL
CO2 SERPL-SCNC: 24 MMOL/L
COLOR: YELLOW
CREAT SERPL-MCNC: 1.18 MG/DL
CREAT SPEC-SCNC: 102 MG/DL
EGFR: 73 ML/MIN/1.73M2
EPITHELIAL CELLS: 0 /HPF
ESTIMATED AVERAGE GLUCOSE: 117 MG/DL
GLUCOSE QUALITATIVE U: NEGATIVE MG/DL
GLUCOSE SERPL-MCNC: 86 MG/DL
HBA1C MFR BLD HPLC: 5.7 %
HDLC SERPL-MCNC: 58 MG/DL
KETONES URINE: NEGATIVE MG/DL
LDLC SERPL CALC-MCNC: 93 MG/DL
LEUKOCYTE ESTERASE URINE: NEGATIVE
MICROALBUMIN 24H UR DL<=1MG/L-MCNC: <1.2 MG/DL
MICROALBUMIN/CREAT 24H UR-RTO: NORMAL MG/G
MICROSCOPIC-UA: NORMAL
NITRITE URINE: NEGATIVE
NONHDLC SERPL-MCNC: 107 MG/DL
PH URINE: 6
POTASSIUM SERPL-SCNC: 4.3 MMOL/L
PROT SERPL-MCNC: 7.3 G/DL
PROTEIN URINE: NEGATIVE MG/DL
PSA SERPL-MCNC: 0.82 NG/ML
RED BLOOD CELLS URINE: 0 /HPF
SODIUM SERPL-SCNC: 142 MMOL/L
SPECIFIC GRAVITY URINE: 1.02
TRIGL SERPL-MCNC: 78 MG/DL
TSH SERPL-ACNC: 0.7 UIU/ML
UROBILINOGEN URINE: 0.2 MG/DL
WHITE BLOOD CELLS URINE: 0 /HPF

## 2023-07-27 PROCEDURE — 93246 EXT ECG>7D<15D RECORDING: CPT

## 2023-07-27 PROCEDURE — 99204 OFFICE O/P NEW MOD 45 MIN: CPT | Mod: 25

## 2023-07-27 PROCEDURE — 93000 ELECTROCARDIOGRAM COMPLETE: CPT | Mod: 59

## 2023-07-28 NOTE — REVIEW OF SYSTEMS
[Dyspnea on exertion] : dyspnea during exertion [Anxiety] : anxiety [Headache] : no headache [Blurry Vision] : no blurred vision [Sore Throat] : no sore throat [Chest Discomfort] : no chest discomfort [Lower Ext Edema] : no extremity edema [Palpitations] : no palpitations [Cough] : no cough [Abdominal Pain] : no abdominal pain [Rash] : no rash [Dizziness] : no dizziness [Easy Bruising] : no tendency for easy bruising

## 2023-07-28 NOTE — HISTORY OF PRESENT ILLNESS
[FreeTextEntry1] : Clay Guerrero is a 57 year old man, former cigarette smoker with past medical history of  Anxiety and chronic pain secondary to left shoulder injury and herniated discs presents for consultation regarding new onset atrial fibrillation. \par \par The patient reports that since his brother passed away last year he has been under significant stress. He also reports that the last few days at work have been very stressful and he has had sweating and fatigue due to extreme heat and lack of air conditioning at work. He reports that he did not drink much fluids. He reports that recently he has noticed shortness of breath when running up the stairs but has not experienced chest pain on exertion. He denies palpitations, dizziness or syncope. Denies leg swelling. Denies starting any new medications, no energy drinks. Drinks small amount of coffee daily and beer. Denies prior history of atrial fibrillation.

## 2023-07-28 NOTE — PHYSICAL EXAM
[Normal Conjunctiva] : normal conjunctiva [Normal Gait] : normal gait [No Edema] : no edema [Normal] : moves all extremities, no focal deficits, normal speech [Appears Anxious] : appears anxious [de-identified] : anxious  [de-identified] : supple, no carotid bruits b/l [de-identified] : JVP ~ 7 cm H20, irregularly irregular, s1, s2, no murmurs [de-identified] : unlabored respirations, clear lung fields [de-identified] : non-distended

## 2023-07-28 NOTE — ASSESSMENT
[FreeTextEntry1] : Assessment:\par Clay Guerrero is a 57 year old man, former cigarette smoker with past medical history of  Anxiety and chronic pain secondary to left shoulder injury and herniated discs presents for consultation regarding new onset atrial fibrillation. \par \par The patient reports that since his brother passed away last year he has been under significant stress. He also reports that the last few days at work have been very stressful and he has had sweating and fatigue due to extreme heat and lack of air conditioning at work. He reports recent onset of exertional dyspnea particularly when running up the stairs, denies exertional angina, palpitations or presyncope. His dyspnea may be secondary to atrial fibrillation. ECG reviewed from PCP office and today's ECG consistent with rate controlled atrial fibrillation, prior ECG was normal sinus rhythm on chart review. BP normotensive, and recent labs unremarkable including normal TSH. 10 yr ASCVD risk score 3.6%. Atrial fibrillation may be genetic in this patient due to family history. \par \par Recommendations:\par [] New onset atrial fibrillation: Explained physiology of atrial fibrillation to patient with diagrams and also discussed risk of stroke, explained that his risk for stroke is low at this time due to lack of typical risk factors. Recommended echocardiogram today in office, patient unable to stay for echo appointment and states he will follow up for echo within one week. Will place one week cardiac event monitor to evaluate overall heart rate and burden. No indication for AV buddy blocker at this time. [id number] score of 0 points and so may continue Aspirin at this time. Explained to patient that he may benefit from Cardiac EP evaluation for ROYCE/DCCV to restore rhythm to normal to prevent cardiomyopathy in future, he is hesitant and would prefer medical management at this time. Will need non-invasive ischemic evaluation to be re-addressed at next visit. \par [] Former tobacco use: Will plan for abdominal aortic US\par [] Return to office: 1 month

## 2023-08-11 ENCOUNTER — APPOINTMENT (OUTPATIENT)
Dept: CARDIOLOGY | Facility: CLINIC | Age: 58
End: 2023-08-11
Payer: COMMERCIAL

## 2023-08-11 PROCEDURE — 93306 TTE W/DOPPLER COMPLETE: CPT

## 2023-08-11 PROCEDURE — 93248 EXT ECG>7D<15D REV&INTERPJ: CPT

## 2023-08-17 ENCOUNTER — APPOINTMENT (OUTPATIENT)
Dept: CARDIOLOGY | Facility: CLINIC | Age: 58
End: 2023-08-17
Payer: COMMERCIAL

## 2023-08-17 ENCOUNTER — NON-APPOINTMENT (OUTPATIENT)
Age: 58
End: 2023-08-17

## 2023-08-17 VITALS
DIASTOLIC BLOOD PRESSURE: 87 MMHG | WEIGHT: 172 LBS | SYSTOLIC BLOOD PRESSURE: 123 MMHG | HEIGHT: 66 IN | TEMPERATURE: 95.3 F | BODY MASS INDEX: 27.64 KG/M2 | OXYGEN SATURATION: 98 % | RESPIRATION RATE: 19 BRPM

## 2023-08-17 PROCEDURE — 93000 ELECTROCARDIOGRAM COMPLETE: CPT

## 2023-08-17 PROCEDURE — 99214 OFFICE O/P EST MOD 30 MIN: CPT | Mod: 25

## 2023-08-18 NOTE — REVIEW OF SYSTEMS
[Dyspnea on exertion] : dyspnea during exertion [Anxiety] : anxiety [Headache] : no headache [Blurry Vision] : no blurred vision [Sore Throat] : no sore throat [Chest Discomfort] : no chest discomfort [Lower Ext Edema] : no extremity edema [Palpitations] : no palpitations [Cough] : no cough [Abdominal Pain] : no abdominal pain [Rash] : no rash [Easy Bruising] : no tendency for easy bruising [Dizziness] : no dizziness

## 2023-08-18 NOTE — PHYSICAL EXAM
[Normal Conjunctiva] : normal conjunctiva [Normal Gait] : normal gait [No Edema] : no edema [Normal] : moves all extremities, no focal deficits, normal speech [Appears Anxious] : appears anxious [de-identified] : well appearing [de-identified] : supple [de-identified] : JVP ~ 7 cm H20, irregularly irregular, s1, s2, no murmurs [de-identified] : unlabored respirations, clear lung fields [de-identified] : non-distended

## 2023-08-18 NOTE — CARDIOLOGY SUMMARY
[de-identified] : ECG (7/27/23): atrial fibrillation  ECG (8/17/23): atrial fibrillation [de-identified] : Cardiac Event Monitor (7/2023): Atrial fibrillation (100% burden). [de-identified] : TTE (8/2023): LVEF 50-55%. Normal RV size and systolic function.

## 2023-08-18 NOTE — ASSESSMENT
[FreeTextEntry1] : Assessment: Clay Guerrero is a 57 year old man, former cigarette smoker with past medical history of  Anxiety and chronic pain secondary to left shoulder injury and herniated discs presents for follow up visit regarding new onset atrial fibrillation.   Since his last visit the patient feels less stress and also found out that 2 of his siblings also have atrial fibrillation. He denies angina, dyspnea, palpitations or presyncope. ECG consistent with rate controlled atrial fibrillation. Cardiac event monitor consistent with 100% burden of atrial fibrillation. Echocardiogram consistent with normal LVEF 50-55% and normal RV size and systolic function, no valvular disease. Recent labs unremarkable including normal TSH. 10 yr ASCVD risk score 3.6%. Atrial fibrillation may be genetic in this patient due to family history.   Recommendations: [] New onset atrial fibrillation: Explained physiology of atrial fibrillation to patient and also discussed risk of stroke, explained that his risk for stroke is low at this time due to lack of typical risk factors. Will check leg US to ensure no VTE, also check coronary CTA to evaluate for ischemic heart disease. HR well controlled, no indication for AV buddy blocker at this time. [id number] score of 0 points and so may continue Aspirin at this time. Explained to patient that he may benefit from Cardiac EP evaluation for ROYCE/DCCV to restore rhythm to normal to prevent cardiomyopathy in future, patient now amenable to this, discussed risks/benefits/alternatives of ROYCE/DCCV to patient and he agrees to proceed, discussed with patient that he will need short course of anticoagulant post procedure, will plan for this after coronary CT angiogram.  [] Former tobacco use: Check abdominal aortic US [] Return to office: 6-8 weeks

## 2023-08-18 NOTE — HISTORY OF PRESENT ILLNESS
[FreeTextEntry1] : Clay Guerrero is a 57 year old man, former cigarette smoker with past medical history of  Anxiety and chronic pain secondary to left shoulder injury and herniated discs presents for follow up visit regarding new onset atrial fibrillation.   Since his last visit the patient feels less stress now and has discovered that 2 of his siblings also have atrial fibrillation. He continues to feel well, denies palpitations, dizziness, chest pain or shortness of breath.

## 2023-09-07 ENCOUNTER — RX RENEWAL (OUTPATIENT)
Age: 58
End: 2023-09-07

## 2023-09-11 ENCOUNTER — NON-APPOINTMENT (OUTPATIENT)
Age: 58
End: 2023-09-11

## 2023-09-12 ENCOUNTER — APPOINTMENT (OUTPATIENT)
Dept: CARDIOLOGY | Facility: CLINIC | Age: 58
End: 2023-09-12
Payer: COMMERCIAL

## 2023-09-12 PROCEDURE — 93970 EXTREMITY STUDY: CPT

## 2023-09-12 PROCEDURE — 93978 VASCULAR STUDY: CPT

## 2023-09-20 DIAGNOSIS — R20.2 PARESTHESIA OF SKIN: ICD-10-CM

## 2023-09-20 DIAGNOSIS — M54.12 RADICULOPATHY, CERVICAL REGION: ICD-10-CM

## 2023-09-21 ENCOUNTER — APPOINTMENT (OUTPATIENT)
Dept: CT IMAGING | Facility: CLINIC | Age: 58
End: 2023-09-21
Payer: COMMERCIAL

## 2023-09-21 ENCOUNTER — OUTPATIENT (OUTPATIENT)
Dept: OUTPATIENT SERVICES | Facility: HOSPITAL | Age: 58
LOS: 1 days | End: 2023-09-21
Payer: COMMERCIAL

## 2023-09-21 DIAGNOSIS — I48.91 UNSPECIFIED ATRIAL FIBRILLATION: ICD-10-CM

## 2023-09-21 PROCEDURE — 75574 CT ANGIO HRT W/3D IMAGE: CPT | Mod: 26

## 2023-09-21 PROCEDURE — 75574 CT ANGIO HRT W/3D IMAGE: CPT

## 2023-09-22 ENCOUNTER — RESULT REVIEW (OUTPATIENT)
Age: 58
End: 2023-09-22

## 2023-09-22 ENCOUNTER — APPOINTMENT (OUTPATIENT)
Dept: CARDIOLOGY | Facility: CLINIC | Age: 58
End: 2023-09-22
Payer: COMMERCIAL

## 2023-09-22 ENCOUNTER — NON-APPOINTMENT (OUTPATIENT)
Age: 58
End: 2023-09-22

## 2023-09-22 ENCOUNTER — OUTPATIENT (OUTPATIENT)
Dept: OUTPATIENT SERVICES | Facility: HOSPITAL | Age: 58
LOS: 1 days | End: 2023-09-22
Payer: COMMERCIAL

## 2023-09-22 VITALS
OXYGEN SATURATION: 100 % | DIASTOLIC BLOOD PRESSURE: 80 MMHG | SYSTOLIC BLOOD PRESSURE: 120 MMHG | HEIGHT: 66 IN | HEART RATE: 92 BPM

## 2023-09-22 DIAGNOSIS — Z00.8 ENCOUNTER FOR OTHER GENERAL EXAMINATION: ICD-10-CM

## 2023-09-22 PROCEDURE — 0504T: CPT

## 2023-09-22 PROCEDURE — 99214 OFFICE O/P EST MOD 30 MIN: CPT | Mod: 25

## 2023-09-22 PROCEDURE — 0503T: CPT

## 2023-09-22 PROCEDURE — 0502T: CPT

## 2023-09-22 PROCEDURE — 93000 ELECTROCARDIOGRAM COMPLETE: CPT

## 2023-09-27 ENCOUNTER — APPOINTMENT (OUTPATIENT)
Dept: CV DIAGNOSITCS | Facility: HOSPITAL | Age: 58
End: 2023-09-27

## 2023-10-05 ENCOUNTER — TRANSCRIPTION ENCOUNTER (OUTPATIENT)
Age: 58
End: 2023-10-05

## 2023-10-05 ENCOUNTER — OUTPATIENT (OUTPATIENT)
Dept: OUTPATIENT SERVICES | Facility: HOSPITAL | Age: 58
LOS: 1 days | End: 2023-10-05
Payer: COMMERCIAL

## 2023-10-05 VITALS
RESPIRATION RATE: 16 BRPM | OXYGEN SATURATION: 100 % | DIASTOLIC BLOOD PRESSURE: 75 MMHG | SYSTOLIC BLOOD PRESSURE: 102 MMHG | HEART RATE: 86 BPM

## 2023-10-05 VITALS — DIASTOLIC BLOOD PRESSURE: 71 MMHG | SYSTOLIC BLOOD PRESSURE: 102 MMHG | OXYGEN SATURATION: 98 % | HEART RATE: 80 BPM

## 2023-10-05 DIAGNOSIS — I48.91 UNSPECIFIED ATRIAL FIBRILLATION: ICD-10-CM

## 2023-10-05 LAB
ANION GAP SERPL CALC-SCNC: 8 MMOL/L — SIGNIFICANT CHANGE UP (ref 5–17)
BUN SERPL-MCNC: 25 MG/DL — HIGH (ref 7–23)
CALCIUM SERPL-MCNC: 9.1 MG/DL — SIGNIFICANT CHANGE UP (ref 8.4–10.5)
CHLORIDE SERPL-SCNC: 101 MMOL/L — SIGNIFICANT CHANGE UP (ref 96–108)
CO2 SERPL-SCNC: 29 MMOL/L — SIGNIFICANT CHANGE UP (ref 22–31)
CREAT SERPL-MCNC: 1.07 MG/DL — SIGNIFICANT CHANGE UP (ref 0.5–1.3)
EGFR: 81 ML/MIN/1.73M2 — SIGNIFICANT CHANGE UP
GLUCOSE SERPL-MCNC: 92 MG/DL — SIGNIFICANT CHANGE UP (ref 70–99)
HCT VFR BLD CALC: 43.4 % — SIGNIFICANT CHANGE UP (ref 39–50)
HGB BLD-MCNC: 14.3 G/DL — SIGNIFICANT CHANGE UP (ref 13–17)
MCHC RBC-ENTMCNC: 29.7 PG — SIGNIFICANT CHANGE UP (ref 27–34)
MCHC RBC-ENTMCNC: 32.9 GM/DL — SIGNIFICANT CHANGE UP (ref 32–36)
MCV RBC AUTO: 90 FL — SIGNIFICANT CHANGE UP (ref 80–100)
NRBC # BLD: 0 /100 WBCS — SIGNIFICANT CHANGE UP (ref 0–0)
PLATELET # BLD AUTO: 252 K/UL — SIGNIFICANT CHANGE UP (ref 150–400)
POTASSIUM SERPL-MCNC: 4 MMOL/L — SIGNIFICANT CHANGE UP (ref 3.5–5.3)
POTASSIUM SERPL-SCNC: 4 MMOL/L — SIGNIFICANT CHANGE UP (ref 3.5–5.3)
RBC # BLD: 4.82 M/UL — SIGNIFICANT CHANGE UP (ref 4.2–5.8)
RBC # FLD: 13.2 % — SIGNIFICANT CHANGE UP (ref 10.3–14.5)
SODIUM SERPL-SCNC: 138 MMOL/L — SIGNIFICANT CHANGE UP (ref 135–145)
WBC # BLD: 6.18 K/UL — SIGNIFICANT CHANGE UP (ref 3.8–10.5)
WBC # FLD AUTO: 6.18 K/UL — SIGNIFICANT CHANGE UP (ref 3.8–10.5)

## 2023-10-05 PROCEDURE — 93010 ELECTROCARDIOGRAM REPORT: CPT

## 2023-10-05 PROCEDURE — C9600: CPT | Mod: LD

## 2023-10-05 PROCEDURE — C1874: CPT

## 2023-10-05 PROCEDURE — 80048 BASIC METABOLIC PNL TOTAL CA: CPT

## 2023-10-05 PROCEDURE — 93010 ELECTROCARDIOGRAM REPORT: CPT | Mod: 77

## 2023-10-05 PROCEDURE — 93458 L HRT ARTERY/VENTRICLE ANGIO: CPT | Mod: 26,59

## 2023-10-05 PROCEDURE — 36415 COLL VENOUS BLD VENIPUNCTURE: CPT

## 2023-10-05 PROCEDURE — C1725: CPT

## 2023-10-05 PROCEDURE — 85027 COMPLETE CBC AUTOMATED: CPT

## 2023-10-05 PROCEDURE — 92928 PRQ TCAT PLMT NTRAC ST 1 LES: CPT | Mod: LD

## 2023-10-05 PROCEDURE — C1887: CPT

## 2023-10-05 PROCEDURE — C1769: CPT

## 2023-10-05 PROCEDURE — 99152 MOD SED SAME PHYS/QHP 5/>YRS: CPT

## 2023-10-05 PROCEDURE — 93005 ELECTROCARDIOGRAM TRACING: CPT

## 2023-10-05 PROCEDURE — 93458 L HRT ARTERY/VENTRICLE ANGIO: CPT | Mod: 59

## 2023-10-05 PROCEDURE — C1894: CPT

## 2023-10-05 RX ORDER — SODIUM CHLORIDE 9 MG/ML
1000 INJECTION INTRAMUSCULAR; INTRAVENOUS; SUBCUTANEOUS
Refills: 0 | Status: DISCONTINUED | OUTPATIENT
Start: 2023-10-05 | End: 2023-10-19

## 2023-10-05 RX ORDER — ASPIRIN/CALCIUM CARB/MAGNESIUM 324 MG
81 TABLET ORAL DAILY
Refills: 0 | Status: DISCONTINUED | OUTPATIENT
Start: 2023-10-05 | End: 2023-10-19

## 2023-10-05 RX ORDER — SODIUM CHLORIDE 9 MG/ML
250 INJECTION INTRAMUSCULAR; INTRAVENOUS; SUBCUTANEOUS ONCE
Refills: 0 | Status: COMPLETED | OUTPATIENT
Start: 2023-10-05 | End: 2023-10-05

## 2023-10-05 RX ORDER — ASPIRIN/CALCIUM CARB/MAGNESIUM 324 MG
1 TABLET ORAL
Qty: 90 | Refills: 2
Start: 2023-10-05 | End: 2024-06-30

## 2023-10-05 RX ORDER — CLOPIDOGREL BISULFATE 75 MG/1
1 TABLET, FILM COATED ORAL
Qty: 90 | Refills: 3
Start: 2023-10-05

## 2023-10-05 RX ORDER — NALOXONE HYDROCHLORIDE 4 MG/.1ML
4 SPRAY NASAL
Qty: 1 | Refills: 0
Start: 2023-10-05 | End: 2024-01-02

## 2023-10-05 RX ADMIN — SODIUM CHLORIDE 75 MILLILITER(S): 9 INJECTION INTRAMUSCULAR; INTRAVENOUS; SUBCUTANEOUS at 11:01

## 2023-10-05 RX ADMIN — SODIUM CHLORIDE 100 MILLILITER(S): 9 INJECTION INTRAMUSCULAR; INTRAVENOUS; SUBCUTANEOUS at 11:52

## 2023-10-05 RX ADMIN — SODIUM CHLORIDE 500 MILLILITER(S): 9 INJECTION INTRAMUSCULAR; INTRAVENOUS; SUBCUTANEOUS at 10:20

## 2023-10-05 NOTE — H&P CARDIOLOGY - HISTORY OF PRESENT ILLNESS
58 yo M with PMhx former cigarette smoker , anxiety and chronic shoulder pain secondary to injury , and herniated discs, He has been c/o MERRITT for the past few months. He had a recent ECHO with EF 50-55%. The following studies were performed 9/21 and 9/22 respectively.    FFR :  FFR-CT value of 0.58 in the terminal LAD just distal to a region of focal   moderate-severe luminal narrowing on corresponding coronary CTA. Findings   suggestive of flow-limiting coronary artery disease.    CTA coronaries :   There is focal moderate-severe luminal narrowing in the distal LAD. Study   will be sent for FFR-CT for further evaluation.    Focal calcified plaque in the mid LAD results in minimal luminal   narrowing..    The calculated Agatston score is 44.  He presents today for CARDIAC cath.   No fever or chills, no N/V/D abd pain .   56 yo M with PMhx recently diagnosed Afib CHADS vasc 1 , on asa, former cigarette smoker , anxiety and chronic shoulder pain secondary to injury , and herniated discs, He has been c/o MERRITT for the past few months. He had a recent ECHO with EF 50-55%. The following studies were performed 9/21 and 9/22 respectively.    FFR :  FFR-CT value of 0.58 in the terminal LAD just distal to a region of focal   moderate-severe luminal narrowing on corresponding coronary CTA. Findings   suggestive of flow-limiting coronary artery disease.    CTA coronaries :   There is focal moderate-severe luminal narrowing in the distal LAD. Study   will be sent for FFR-CT for further evaluation.    Focal calcified plaque in the mid LAD results in minimal luminal   narrowing..    The calculated Agatston score is 44.  He presents today for CARDIAC cath.   No fever or chills, no N/V/D abd pain .

## 2023-10-05 NOTE — ASU DISCHARGE PLAN (ADULT/PEDIATRIC) - NS MD DC FALL RISK RISK
For information on Fall & Injury Prevention, visit: [email or web address] OR  [email or web address] OR  https://www.cdc.gov/steadi/patient.html

## 2023-10-05 NOTE — ASU DISCHARGE PLAN (ADULT/PEDIATRIC) - CARE PROVIDER_API CALL
[facility]  Cardi[facility] Disease  [address], [address]  [place] [address]  Phone: [phone]  Fax: [phone]  Established Patient  Follow Up Time: 2 weeks

## 2023-10-05 NOTE — ASU PATIENT PROFILE, ADULT - FALL HARM RISK - UNIVERSAL INTERVENTIONS
Bed in lowest position, wheels locked, appropriate side rails in place/Call bell, personal items and telephone in reach/Instruct patient to call for assistance before getting out of bed or chair/Non-slip footwear when patient is out of bed/[place] to call system/Physically safe environment - no spills, clutter or unnecessary equipment/Purposeful Proactive Rounding/Room/bathroom lighting operational, light cord in reach

## 2023-10-12 ENCOUNTER — APPOINTMENT (OUTPATIENT)
Dept: CARDIOLOGY | Facility: CLINIC | Age: 58
End: 2023-10-12
Payer: COMMERCIAL

## 2023-10-12 ENCOUNTER — NON-APPOINTMENT (OUTPATIENT)
Age: 58
End: 2023-10-12

## 2023-10-12 VITALS
RESPIRATION RATE: 17 BRPM | OXYGEN SATURATION: 98 % | BODY MASS INDEX: 27.32 KG/M2 | SYSTOLIC BLOOD PRESSURE: 124 MMHG | HEART RATE: 98 BPM | WEIGHT: 170 LBS | DIASTOLIC BLOOD PRESSURE: 86 MMHG | HEIGHT: 66 IN

## 2023-10-12 PROCEDURE — 99214 OFFICE O/P EST MOD 30 MIN: CPT | Mod: 25

## 2023-10-12 PROCEDURE — 93000 ELECTROCARDIOGRAM COMPLETE: CPT

## 2023-10-14 ENCOUNTER — NON-APPOINTMENT (OUTPATIENT)
Age: 58
End: 2023-10-14

## 2023-10-16 PROBLEM — Z87.891 PERSONAL HISTORY OF NICOTINE DEPENDENCE: Chronic | Status: ACTIVE | Noted: 2023-10-05

## 2023-10-16 PROBLEM — F12.90 CANNABIS USE, UNSPECIFIED, UNCOMPLICATED: Chronic | Status: ACTIVE | Noted: 2023-10-05

## 2023-10-16 PROBLEM — F41.9 ANXIETY DISORDER, UNSPECIFIED: Chronic | Status: ACTIVE | Noted: 2023-10-05

## 2023-10-24 ENCOUNTER — NON-APPOINTMENT (OUTPATIENT)
Age: 58
End: 2023-10-24

## 2023-11-01 ENCOUNTER — APPOINTMENT (OUTPATIENT)
Dept: FAMILY MEDICINE | Facility: CLINIC | Age: 58
End: 2023-11-01

## 2023-11-03 ENCOUNTER — TRANSCRIPTION ENCOUNTER (OUTPATIENT)
Age: 58
End: 2023-11-03

## 2023-11-03 ENCOUNTER — APPOINTMENT (OUTPATIENT)
Dept: CV DIAGNOSITCS | Facility: HOSPITAL | Age: 58
End: 2023-11-03

## 2023-11-03 ENCOUNTER — OUTPATIENT (OUTPATIENT)
Dept: OUTPATIENT SERVICES | Facility: HOSPITAL | Age: 58
LOS: 1 days | End: 2023-11-03
Payer: COMMERCIAL

## 2023-11-03 VITALS
RESPIRATION RATE: 16 BRPM | DIASTOLIC BLOOD PRESSURE: 72 MMHG | OXYGEN SATURATION: 96 % | SYSTOLIC BLOOD PRESSURE: 106 MMHG | HEART RATE: 93 BPM

## 2023-11-03 VITALS
TEMPERATURE: 98 F | WEIGHT: 169.98 LBS | HEIGHT: 66 IN | HEART RATE: 137 BPM | OXYGEN SATURATION: 100 % | DIASTOLIC BLOOD PRESSURE: 89 MMHG | SYSTOLIC BLOOD PRESSURE: 143 MMHG

## 2023-11-03 DIAGNOSIS — Z95.5 PRESENCE OF CORONARY ANGIOPLASTY IMPLANT AND GRAFT: Chronic | ICD-10-CM

## 2023-11-03 DIAGNOSIS — I48.91 UNSPECIFIED ATRIAL FIBRILLATION: ICD-10-CM

## 2023-11-03 DIAGNOSIS — Z98.890 OTHER SPECIFIED POSTPROCEDURAL STATES: Chronic | ICD-10-CM

## 2023-11-03 LAB
ANION GAP SERPL CALC-SCNC: 14 MMOL/L — SIGNIFICANT CHANGE UP (ref 5–17)
ANION GAP SERPL CALC-SCNC: 14 MMOL/L — SIGNIFICANT CHANGE UP (ref 5–17)
BUN SERPL-MCNC: 19 MG/DL — SIGNIFICANT CHANGE UP (ref 7–23)
BUN SERPL-MCNC: 19 MG/DL — SIGNIFICANT CHANGE UP (ref 7–23)
CALCIUM SERPL-MCNC: 9 MG/DL — SIGNIFICANT CHANGE UP (ref 8.4–10.5)
CALCIUM SERPL-MCNC: 9 MG/DL — SIGNIFICANT CHANGE UP (ref 8.4–10.5)
CHLORIDE SERPL-SCNC: 102 MMOL/L — SIGNIFICANT CHANGE UP (ref 96–108)
CHLORIDE SERPL-SCNC: 102 MMOL/L — SIGNIFICANT CHANGE UP (ref 96–108)
CO2 SERPL-SCNC: 24 MMOL/L — SIGNIFICANT CHANGE UP (ref 22–31)
CO2 SERPL-SCNC: 24 MMOL/L — SIGNIFICANT CHANGE UP (ref 22–31)
CREAT SERPL-MCNC: 1.01 MG/DL — SIGNIFICANT CHANGE UP (ref 0.5–1.3)
CREAT SERPL-MCNC: 1.01 MG/DL — SIGNIFICANT CHANGE UP (ref 0.5–1.3)
EGFR: 87 ML/MIN/1.73M2 — SIGNIFICANT CHANGE UP
EGFR: 87 ML/MIN/1.73M2 — SIGNIFICANT CHANGE UP
GLUCOSE SERPL-MCNC: 116 MG/DL — HIGH (ref 70–99)
GLUCOSE SERPL-MCNC: 116 MG/DL — HIGH (ref 70–99)
HCT VFR BLD CALC: 43.3 % — SIGNIFICANT CHANGE UP (ref 39–50)
HCT VFR BLD CALC: 43.3 % — SIGNIFICANT CHANGE UP (ref 39–50)
HGB BLD-MCNC: 14.6 G/DL — SIGNIFICANT CHANGE UP (ref 13–17)
HGB BLD-MCNC: 14.6 G/DL — SIGNIFICANT CHANGE UP (ref 13–17)
MCHC RBC-ENTMCNC: 29.7 PG — SIGNIFICANT CHANGE UP (ref 27–34)
MCHC RBC-ENTMCNC: 29.7 PG — SIGNIFICANT CHANGE UP (ref 27–34)
MCHC RBC-ENTMCNC: 33.7 GM/DL — SIGNIFICANT CHANGE UP (ref 32–36)
MCHC RBC-ENTMCNC: 33.7 GM/DL — SIGNIFICANT CHANGE UP (ref 32–36)
MCV RBC AUTO: 88 FL — SIGNIFICANT CHANGE UP (ref 80–100)
MCV RBC AUTO: 88 FL — SIGNIFICANT CHANGE UP (ref 80–100)
NRBC # BLD: 0 /100 WBCS — SIGNIFICANT CHANGE UP (ref 0–0)
NRBC # BLD: 0 /100 WBCS — SIGNIFICANT CHANGE UP (ref 0–0)
PLATELET # BLD AUTO: 312 K/UL — SIGNIFICANT CHANGE UP (ref 150–400)
PLATELET # BLD AUTO: 312 K/UL — SIGNIFICANT CHANGE UP (ref 150–400)
POTASSIUM SERPL-MCNC: 3.4 MMOL/L — LOW (ref 3.5–5.3)
POTASSIUM SERPL-MCNC: 3.4 MMOL/L — LOW (ref 3.5–5.3)
POTASSIUM SERPL-SCNC: 3.4 MMOL/L — LOW (ref 3.5–5.3)
POTASSIUM SERPL-SCNC: 3.4 MMOL/L — LOW (ref 3.5–5.3)
RBC # BLD: 4.92 M/UL — SIGNIFICANT CHANGE UP (ref 4.2–5.8)
RBC # BLD: 4.92 M/UL — SIGNIFICANT CHANGE UP (ref 4.2–5.8)
RBC # FLD: 12.6 % — SIGNIFICANT CHANGE UP (ref 10.3–14.5)
RBC # FLD: 12.6 % — SIGNIFICANT CHANGE UP (ref 10.3–14.5)
SODIUM SERPL-SCNC: 141 MMOL/L — SIGNIFICANT CHANGE UP (ref 135–145)
SODIUM SERPL-SCNC: 141 MMOL/L — SIGNIFICANT CHANGE UP (ref 135–145)
WBC # BLD: 6.54 K/UL — SIGNIFICANT CHANGE UP (ref 3.8–10.5)
WBC # BLD: 6.54 K/UL — SIGNIFICANT CHANGE UP (ref 3.8–10.5)
WBC # FLD AUTO: 6.54 K/UL — SIGNIFICANT CHANGE UP (ref 3.8–10.5)
WBC # FLD AUTO: 6.54 K/UL — SIGNIFICANT CHANGE UP (ref 3.8–10.5)

## 2023-11-03 PROCEDURE — 76377 3D RENDER W/INTRP POSTPROCES: CPT | Mod: 26

## 2023-11-03 PROCEDURE — 92960 CARDIOVERSION ELECTRIC EXT: CPT

## 2023-11-03 PROCEDURE — 93325 DOPPLER ECHO COLOR FLOW MAPG: CPT | Mod: 26

## 2023-11-03 PROCEDURE — 80048 BASIC METABOLIC PNL TOTAL CA: CPT

## 2023-11-03 PROCEDURE — 93320 DOPPLER ECHO COMPLETE: CPT

## 2023-11-03 PROCEDURE — 85027 COMPLETE CBC AUTOMATED: CPT

## 2023-11-03 PROCEDURE — 93312 ECHO TRANSESOPHAGEAL: CPT | Mod: 26

## 2023-11-03 PROCEDURE — 93005 ELECTROCARDIOGRAM TRACING: CPT

## 2023-11-03 PROCEDURE — 76377 3D RENDER W/INTRP POSTPROCES: CPT

## 2023-11-03 PROCEDURE — 93325 DOPPLER ECHO COLOR FLOW MAPG: CPT

## 2023-11-03 PROCEDURE — 93312 ECHO TRANSESOPHAGEAL: CPT

## 2023-11-03 PROCEDURE — 93010 ELECTROCARDIOGRAM REPORT: CPT | Mod: 76

## 2023-11-03 PROCEDURE — 93320 DOPPLER ECHO COMPLETE: CPT | Mod: 26

## 2023-11-03 RX ORDER — DIAZEPAM 5 MG
1 TABLET ORAL
Refills: 0 | DISCHARGE

## 2023-11-03 NOTE — ASU DISCHARGE PLAN (ADULT/PEDIATRIC) - ASU DC SPECIAL INSTRUCTIONSFT
FOLLOW up with Dr. Ayoub at 11/13 at 1245 to discuss ablation   office is   [address] [address] [address]   phone # [phone]

## 2023-11-03 NOTE — CHART NOTE - NSCHARTNOTEFT_GEN_A_CORE
s/p is s/p ROYCE and unsuccessful DCCV  Pt met with Dr. Ayoub in recovery and will follow up at [facility] to discuss ablation   Pt will continue eliquis and Plavix s/p is s/p ROYCE and unsuccessful DCCV  Pt met with Dr. Ayoub in recovery and will follow up at [facility] to discuss ablation on 11/13   Discussed with Dr. Rodriguez who performed PCI in October patient may stop ASA and continue Plavix and Eliquis   Pt informed of cahnge of medications with good understanding

## 2023-11-03 NOTE — PRE-ANESTHESIA EVALUATION ADULT - NSANTHOSAYNRD_GEN_A_CORE
No. MARYLIN screening performed.  STOP BANG Legend: 0-2 = LOW Risk; 3-4 = INTERMEDIATE Risk; 5-8 = HIGH Risk

## 2023-11-03 NOTE — H&P CARDIOLOGY - NSICDXPASTMEDICALHX_GEN_ALL_CORE_FT
PAST MEDICAL HISTORY:  Anxiety     CAD (coronary artery disease)     Former smoker     History of atrial fibrillation     Marijuana smoker

## 2023-11-03 NOTE — ASU DISCHARGE PLAN (ADULT/PEDIATRIC) - NS MD DC FALL RISK RISK
For information on Fall & Injury Prevention, visit: [email or web address] OR  [email or web address] OR  https://www.cdc.gov/steadi/patient.html

## 2023-11-03 NOTE — H&P CARDIOLOGY - HISTORY OF PRESENT ILLNESS
This is a 59 yo man with history of CAD s/p PCI LAD 10/5/2023 on DAPT, atrial fibrillation on Eliquis last dose was this morning, smokes marijuana daily, chronic shoulder pain takes oxycodone as needed,  presents as outpatient for ROYCE/ DCCV, Pt has c/o SOB at home. Pt has been on triple therapy AC since stent. Pt took Valium this morning for anxiety. EKG confirmed atrial fibrillation.   Referring MD Dr. Heather Ayoub   denies implanted cardiac monitors     Study Date:     10/05/2023   Name:           NICOLE HDEZ   :            1965   (58 years)   Gender:         male   MR#:            [id number]   MPI#:           [id number]   Patient Class:  Outpatient     Cath Lab Report    Diagnostic Cardiologist:       Jelani Rodriguez MD   Interventional Cardiologist:   Jelani Rodriguez MD   Fellow:                        Ashanti William MD   Referring Physician:           Yogi Romero MD     Procedures Performed   Procedures:               1.    Arterial Access - Right Radial     2.    Diagnostic Coronary Angiography   3.    Left Heart Cath   4.    PCI: TIMBO     Indications:                Positive Stress Test   PCI Status:                elective     Conclusions:   1. Severe athosclerotic lesion of the mid LAD, correlating to site of  positive CT FFR    2. Successul TIMBO placement to mid LAD along with post dilation,  resulting in JIGNESH 3 flow and no chest pain at the end of the  case.    Recommendations:     1. Maintain on DAPT for 6months   2. Recommend aggressive medical management of CAD as per ACC/AHA  guidelines.  3. Findings relayed to patient and patient's cardiologist.      Presentation:   58 year old man with HTN who presents today with positive CT (mid LAD,  CT FFR positive) who is taken to  [facility] for LHC in the setting of shortness of breath.    Chief Compaint: shortness of breath         This is a 59 yo man with history of CAD s/p PCI LAD 10/5/2023 on DAPT, atrial fibrillation on Eliquis last dose was this morning (recently started taking mid october), smokes marijuana daily, chronic shoulder pain takes oxycodone as needed,  presents as outpatient for ROYCE/ DCCV, Pt has c/o SOB at home. Pt has been on triple therapy AC since mid october when Eliquis added. Pt took Valium this morning for anxiety. EKG confirmed atrial fibrillation.   Referring MD Dr. Heather Ayoub   denies implanted cardiac monitors     Study Date:     10/05/2023   Name:           NICOLE HDEZ   :            1965   (58 years)   Gender:         male   MR#:            [id number]   MPI#:           [id number]   Patient Class:  Outpatient     Cath Lab Report    Diagnostic Cardiologist:       Jelani Rodriguez MD   Interventional Cardiologist:   Jelani Rodriguez MD   Fellow:                        Ashanti William MD   Referring Physician:           Yogi Romero MD     Procedures Performed   Procedures:               1.    Arterial Access - Right Radial     2.    Diagnostic Coronary Angiography   3.    Left Heart Cath   4.    PCI: TIMBO     Indications:                Positive Stress Test   PCI Status:                elective     Conclusions:   1. Severe athosclerotic lesion of the mid LAD, correlating to site of  positive CT FFR    2. Successul TIMBO placement to mid LAD along with post dilation,  resulting in JIGNESH 3 flow and no chest pain at the end of the  case.    Recommendations:     1. Maintain on DAPT for 6months   2. Recommend aggressive medical management of CAD as per ACC/AHA  guidelines.  3. Findings relayed to patient and patient's cardiologist.      Presentation:   58 year old man with HTN who presents today with positive CT (mid LAD,  CT FFR positive) who is taken to  [facility] for LHC in the setting of shortness of breath.    Chief Compaint: shortness of breath

## 2023-11-10 ENCOUNTER — APPOINTMENT (OUTPATIENT)
Dept: FAMILY MEDICINE | Facility: CLINIC | Age: 58
End: 2023-11-10
Payer: COMMERCIAL

## 2023-11-10 VITALS
RESPIRATION RATE: 16 BRPM | SYSTOLIC BLOOD PRESSURE: 128 MMHG | OXYGEN SATURATION: 97 % | TEMPERATURE: 96.6 F | DIASTOLIC BLOOD PRESSURE: 78 MMHG | WEIGHT: 173 LBS | HEART RATE: 82 BPM | HEIGHT: 66 IN | BODY MASS INDEX: 27.8 KG/M2

## 2023-11-10 DIAGNOSIS — I25.10 ATHEROSCLEROTIC HEART DISEASE OF NATIVE CORONARY ARTERY W/OUT ANGINA PECTORIS: ICD-10-CM

## 2023-11-10 DIAGNOSIS — Z95.5 PRESENCE OF CORONARY ANGIOPLASTY IMPLANT AND GRAFT: ICD-10-CM

## 2023-11-10 DIAGNOSIS — I25.84 ATHEROSCLEROTIC HEART DISEASE OF NATIVE CORONARY ARTERY W/OUT ANGINA PECTORIS: ICD-10-CM

## 2023-11-10 PROBLEM — Z86.79 PERSONAL HISTORY OF OTHER DISEASES OF THE CIRCULATORY SYSTEM: Chronic | Status: ACTIVE | Noted: 2023-11-03

## 2023-11-10 PROCEDURE — 99214 OFFICE O/P EST MOD 30 MIN: CPT | Mod: 25

## 2023-11-10 PROCEDURE — 90686 IIV4 VACC NO PRSV 0.5 ML IM: CPT

## 2023-11-10 PROCEDURE — G0008: CPT

## 2023-12-11 ENCOUNTER — APPOINTMENT (OUTPATIENT)
Dept: ELECTROPHYSIOLOGY | Facility: CLINIC | Age: 58
End: 2023-12-11

## 2023-12-23 ENCOUNTER — RX RENEWAL (OUTPATIENT)
Age: 58
End: 2023-12-23

## 2023-12-29 RX ORDER — DIAZEPAM 5 MG/1
5 TABLET ORAL
Qty: 60 | Refills: 0 | Status: COMPLETED | COMMUNITY
Start: 2021-03-31 | End: 2023-12-29

## 2024-01-10 ENCOUNTER — APPOINTMENT (OUTPATIENT)
Dept: ELECTROPHYSIOLOGY | Facility: CLINIC | Age: 59
End: 2024-01-10
Payer: COMMERCIAL

## 2024-01-10 ENCOUNTER — NON-APPOINTMENT (OUTPATIENT)
Age: 59
End: 2024-01-10

## 2024-01-10 VITALS
SYSTOLIC BLOOD PRESSURE: 132 MMHG | HEIGHT: 66 IN | WEIGHT: 170 LBS | RESPIRATION RATE: 14 BRPM | OXYGEN SATURATION: 96 % | BODY MASS INDEX: 27.32 KG/M2 | DIASTOLIC BLOOD PRESSURE: 81 MMHG | HEART RATE: 109 BPM

## 2024-01-10 PROCEDURE — 99205 OFFICE O/P NEW HI 60 MIN: CPT | Mod: 25

## 2024-01-10 PROCEDURE — 93000 ELECTROCARDIOGRAM COMPLETE: CPT

## 2024-01-10 RX ORDER — TOBRAMYCIN 3 MG/ML
0.3 SOLUTION/ DROPS OPHTHALMIC 4 TIMES DAILY
Qty: 1 | Refills: 3 | Status: DISCONTINUED | COMMUNITY
Start: 2022-10-13 | End: 2024-01-10

## 2024-01-10 RX ORDER — NAPROXEN SODIUM 375 MG/1
375 TABLET, FILM COATED, EXTENDED RELEASE ORAL
Qty: 90 | Refills: 1 | Status: DISCONTINUED | COMMUNITY
Start: 2019-09-18 | End: 2024-01-10

## 2024-01-11 NOTE — CARDIOLOGY SUMMARY
[de-identified] : 8/11/23: 1. The heart rhythm is irregular throughout the study. 2. Normal left ventricular cavity size. Left ventricular wall thickness is mildly increased. Left ventricular systolic function is normal with an ejection fraction of 54 % by Johnson's method of disks with an ejection fraction visually estimated at 50 to 55 %. 3. Normal right ventricular cavity size and normal right ventricular systolic function. 4. The left atrium is normal in size. 5. The right atrium is normal in size. 6. Mild mitral valve leaflet calcification. 7. Trace mitral regurgitation. 8. Structurally normal tricuspid valve with normal leaflet excursion. Trace tricuspid regurgitation. 9. Trileaflet aortic valve. 10. Mild aortic valve leaflet calcification. No aortic valve stenosis. 11. Pulmonic valve was not well visualized. 12. The aortic root at the sinuses of Valsalva diameter is normal, measuring 3.35 cm (indexed 1.78 cm/m). The ascending aorta diameter is normal, measuring 3.30 cm (indexed 1.76 cm/m). 13. The inferior vena cava is normal in size (normal <2.1cm) with normal inspiratory collapse (normal >50%) consistent with normal right atrial pressure (\R\3, range 0-5mmHg). 14. Estimated pulmonary artery systolic pressure is 19 mmHg. 15. No pericardial effusion seen.

## 2024-01-11 NOTE — DISCUSSION/SUMMARY
[FreeTextEntry1] : This is a 58 year old man with symptomatic longstanding persistent AF despite recent cardioversion. Options regarding management, including a rate control strategy with AV buddy blocking agents, or rhythm control strategies employing anti-arrhythmic drugs and/or catheter ablation were reviewed. Given the duration of arrhythmia and his desire to minimize longterm medications we agreed on a return for ablation.  The rationale for the procedure as well as its risks--including but not limited to bleeding, vascular injury, pericardial effusion/tamponade, heart block requiring pacemaker, stroke, and death--were reviewed in detail. After consideration of this information, the decision was made to proceed with the procedure.  He appeared to understand the whole discussion and verbalized that all of his questions were answered to his satisfaction.  Thank you for allowing me to be involved in the care of this pleasant man. Please feel free to contact me with any questions.    Ronnie Ayoub MD  of Cardiology Electrophysiology Section [address] [place] [address] Office: [phone] Fax: [phone] [EKG obtained to assist in diagnosis and management of assessed problem(s)] : EKG obtained to assist in diagnosis and management of assessed problem(s)

## 2024-01-11 NOTE — HISTORY OF PRESENT ILLNESS
[FreeTextEntry1] : Mr. Clay Sargent is a 58 year old man with a history of hyperlipidemia.In 7/23 he presented for a routine physical and was found to be in rapid atrial fibrillation. He endorsed worsened shortness of breath when climbing stairs or sweeping (he is a ). 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 His last ECG in sinus was from 12/2021. In 10/23 he was sent for C with PCI to the mid LAD performed. He was begun subequently on a DOAC and AV buddy blocker and brought in for cardioversion in 11/23 but was back in atrial fibrillation before regaining consciousness from sedation. He has no chest pain, dizziness or syncope.

## 2024-01-19 ENCOUNTER — NON-APPOINTMENT (OUTPATIENT)
Age: 59
End: 2024-01-19

## 2024-02-03 ENCOUNTER — NON-APPOINTMENT (OUTPATIENT)
Age: 59
End: 2024-02-03

## 2024-02-12 ENCOUNTER — APPOINTMENT (OUTPATIENT)
Dept: FAMILY MEDICINE | Facility: CLINIC | Age: 59
End: 2024-02-12
Payer: COMMERCIAL

## 2024-02-12 VITALS
RESPIRATION RATE: 14 BRPM | WEIGHT: 168 LBS | BODY MASS INDEX: 27 KG/M2 | TEMPERATURE: 97.8 F | OXYGEN SATURATION: 97 % | HEART RATE: 74 BPM | DIASTOLIC BLOOD PRESSURE: 79 MMHG | HEIGHT: 66 IN | SYSTOLIC BLOOD PRESSURE: 121 MMHG

## 2024-02-12 DIAGNOSIS — G89.4 CHRONIC PAIN SYNDROME: ICD-10-CM

## 2024-02-12 DIAGNOSIS — F41.9 ANXIETY DISORDER, UNSPECIFIED: ICD-10-CM

## 2024-02-12 PROCEDURE — 99214 OFFICE O/P EST MOD 30 MIN: CPT

## 2024-02-12 RX ORDER — ASPIRIN 81 MG/1
81 TABLET, COATED ORAL
Qty: 90 | Refills: 0 | Status: DISCONTINUED | COMMUNITY
Start: 2023-10-05 | End: 2024-02-12

## 2024-02-12 RX ORDER — BUPROPION HYDROCHLORIDE 150 MG/1
150 TABLET, EXTENDED RELEASE ORAL
Qty: 30 | Refills: 3 | Status: COMPLETED | COMMUNITY
Start: 2020-11-13 | End: 2024-02-12

## 2024-02-12 RX ORDER — CICLOPIROX OLAMINE 7.7 MG/G
0.77 CREAM TOPICAL
Qty: 90 | Refills: 0 | Status: ACTIVE | COMMUNITY
Start: 2023-12-12

## 2024-02-12 NOTE — REVIEW OF SYSTEMS
[Joint Pain] : joint pain [Joint Stiffness] : joint stiffness [Muscle Pain] : muscle pain [Muscle Weakness] : no muscle weakness [Back Pain] : back pain [Joint Swelling] : no joint swelling [Suicidal] : not suicidal [Insomnia] : insomnia [Anxiety] : anxiety [Depression] : no depression [Negative] : Heme/Lymph [de-identified] : Paresthesia left arm

## 2024-02-12 NOTE — HISTORY OF PRESENT ILLNESS
[de-identified] : Patient is a 58-year-old gentleman who presents today for follow-up and disease management patient states that he is in his usual state of health medical history significant for anxiety, atrial fibrillation in fact patient is scheduled for ablation which will be done February 15, 2024, chronic pain syndrome, and coronary artery disease.  Presently patient is awake alert and oriented x 3 in no acute distress calm and cooperative.

## 2024-02-12 NOTE — ASSESSMENT
[FreeTextEntry1] : Assessment and plan:  1.  Generalized anxiety presently well controlled patient does have lorazepam 2 mg patient weaned off of bupropion.  2.  Atrial fibrillation presently patient's heart rate is well controlled he will continue Eliquis 5 mg 1 tablet twice a day recent attempt to cardioversion unsuccessful.  Patient is scheduled for ablation February 15, 2024.  3.  Status post cardiac catheterization status post drug-eluting stent placement continue clopidogrel 75 mg 1 p.o. daily patient presently on clopidogrel,   and Eliquis.  Aspirin has been discontinued.  4.  Chronic pain syndrome this which is multifactorial patient continues oxycodone 10 mg takes medications only as needed.  5.  Hyperlipidemia/coronary artery disease continue atorvastatin 20 mg p.o. daily.  6.  Total time spent face-to-face and non-face-to-face time 35 minutes.

## 2024-02-12 NOTE — HEALTH RISK ASSESSMENT
[Yes] : Yes [Monthly or less (1 pt)] : Monthly or less (1 point) [1 or 2 (0 pts)] : 1 or 2 (0 points) [Never (0 pts)] : Never (0 points) [No falls in past year] : Patient reported no falls in the past year [Little interest or pleasure doing things] : 1) Little interest or pleasure doing things [Feeling down, depressed, or hopeless] : 2) Feeling down, depressed, or hopeless [0] : 2) Feeling down, depressed, or hopeless: Not at all (0) [PHQ-2 Negative - No further assessment needed] : PHQ-2 Negative - No further assessment needed [Audit-CScore] : 1 [[id number]] : 0 [Former] : Former [20 or more] : 20 or more [> 15 Years] : > 15 Years

## 2024-02-12 NOTE — PHYSICAL EXAM
[No Acute Distress] : no acute distress [Well Nourished] : well nourished [Well Developed] : well developed [Well-Appearing] : well-appearing [Normal Voice/Communication] : normal voice/communication [Normal Sclera/Conjunctiva] : normal sclera/conjunctiva [PERRL] : pupils equal round and reactive to light [EOMI] : extraocular movements intact [Normal Outer Ear/Nose] : the outer ears and nose were normal in appearance [Normal Oropharynx] : the oropharynx was normal [Normal TMs] : both tympanic membranes were normal [Normal Nasal Mucosa] : the nasal mucosa was normal [No JVD] : no jugular venous distention [No Lymphadenopathy] : no lymphadenopathy [Thyroid Normal, No Nodules] : the thyroid was normal and there were no nodules present [No Respiratory Distress] : no respiratory distress  [No Accessory Muscle Use] : no accessory muscle use [Clear to Auscultation] : lungs were clear to auscultation bilaterally [Normal Rate] : normal rate  [Normal S1, S2] : normal S1 and S2 [No Murmur] : no murmur heard [No Carotid Bruits] : no carotid bruits [No Abdominal Bruit] : a ~M bruit was not heard ~T in the abdomen [No Varicosities] : no varicosities [Pedal Pulses Present] : the pedal pulses are present [No Edema] : there was no peripheral edema [No Palpable Aorta] : no palpable aorta [No Extremity Clubbing/Cyanosis] : no extremity clubbing/cyanosis [Soft] : abdomen soft [Non Tender] : non-tender [Non-distended] : non-distended [No Masses] : no abdominal mass palpated [No HSM] : no HSM [Normal Bowel Sounds] : normal bowel sounds [No Hernias] : no hernias [No CVA Tenderness] : no CVA  tenderness [No Spinal Tenderness] : no spinal tenderness [No Joint Swelling] : no joint swelling [Grossly Normal Strength/Tone] : grossly normal strength/tone [No Rash] : no rash [Coordination Grossly Intact] : coordination grossly intact [No Focal Deficits] : no focal deficits [Normal Gait] : normal gait [Deep Tendon Reflexes (DTR)] : deep tendon reflexes were 2+ and symmetric [Speech Grossly Normal] : speech grossly normal [Memory Grossly Normal] : memory grossly normal [Normal Affect] : the affect was normal [Alert and Oriented x3] : oriented to person, place, and time [Normal Mood] : the mood was normal [Normal Insight/Judgement] : insight and judgment were intact [de-identified] : Irregularly irregular [de-identified] : Left shoulder range of motion decreased secondary to pain, status post fall work injury torn tendons [de-identified] : Positive Tinel sign, carpal tunnel syndrome, and cubital tunnel syndrome [de-identified] : Anxiety

## 2024-02-15 ENCOUNTER — OUTPATIENT (OUTPATIENT)
Dept: INPATIENT UNIT | Facility: HOSPITAL | Age: 59
LOS: 1 days | End: 2024-02-15
Payer: COMMERCIAL

## 2024-02-15 ENCOUNTER — TRANSCRIPTION ENCOUNTER (OUTPATIENT)
Age: 59
End: 2024-02-15

## 2024-02-15 VITALS
OXYGEN SATURATION: 98 % | TEMPERATURE: 98 F | WEIGHT: 169.98 LBS | RESPIRATION RATE: 16 BRPM | HEIGHT: 66 IN | DIASTOLIC BLOOD PRESSURE: 70 MMHG | HEART RATE: 75 BPM | SYSTOLIC BLOOD PRESSURE: 114 MMHG

## 2024-02-15 DIAGNOSIS — Z95.5 PRESENCE OF CORONARY ANGIOPLASTY IMPLANT AND GRAFT: Chronic | ICD-10-CM

## 2024-02-15 DIAGNOSIS — I48.91 UNSPECIFIED ATRIAL FIBRILLATION: ICD-10-CM

## 2024-02-15 DIAGNOSIS — Z98.890 OTHER SPECIFIED POSTPROCEDURAL STATES: Chronic | ICD-10-CM

## 2024-02-15 LAB
ANION GAP SERPL CALC-SCNC: 9 MMOL/L — SIGNIFICANT CHANGE UP (ref 5–17)
BLD GP AB SCN SERPL QL: NEGATIVE — SIGNIFICANT CHANGE UP
BUN SERPL-MCNC: 22 MG/DL — SIGNIFICANT CHANGE UP (ref 7–23)
CALCIUM SERPL-MCNC: 9.6 MG/DL — SIGNIFICANT CHANGE UP (ref 8.4–10.5)
CHLORIDE SERPL-SCNC: 105 MMOL/L — SIGNIFICANT CHANGE UP (ref 96–108)
CO2 SERPL-SCNC: 30 MMOL/L — SIGNIFICANT CHANGE UP (ref 22–31)
CREAT SERPL-MCNC: 1.01 MG/DL — SIGNIFICANT CHANGE UP (ref 0.5–1.3)
EGFR: 87 ML/MIN/1.73M2 — SIGNIFICANT CHANGE UP
GLUCOSE SERPL-MCNC: 109 MG/DL — HIGH (ref 70–99)
HCT VFR BLD CALC: 45.6 % — SIGNIFICANT CHANGE UP (ref 39–50)
HGB BLD-MCNC: 14.7 G/DL — SIGNIFICANT CHANGE UP (ref 13–17)
MCHC RBC-ENTMCNC: 29.5 PG — SIGNIFICANT CHANGE UP (ref 27–34)
MCHC RBC-ENTMCNC: 32.2 GM/DL — SIGNIFICANT CHANGE UP (ref 32–36)
MCV RBC AUTO: 91.4 FL — SIGNIFICANT CHANGE UP (ref 80–100)
NRBC # BLD: 0 /100 WBCS — SIGNIFICANT CHANGE UP (ref 0–0)
PLATELET # BLD AUTO: 273 K/UL — SIGNIFICANT CHANGE UP (ref 150–400)
POTASSIUM SERPL-MCNC: 4.8 MMOL/L — SIGNIFICANT CHANGE UP (ref 3.5–5.3)
POTASSIUM SERPL-SCNC: 4.8 MMOL/L — SIGNIFICANT CHANGE UP (ref 3.5–5.3)
RBC # BLD: 4.99 M/UL — SIGNIFICANT CHANGE UP (ref 4.2–5.8)
RBC # FLD: 13.1 % — SIGNIFICANT CHANGE UP (ref 10.3–14.5)
RH IG SCN BLD-IMP: POSITIVE — SIGNIFICANT CHANGE UP
RH IG SCN BLD-IMP: POSITIVE — SIGNIFICANT CHANGE UP
SODIUM SERPL-SCNC: 144 MMOL/L — SIGNIFICANT CHANGE UP (ref 135–145)
WBC # BLD: 7.82 K/UL — SIGNIFICANT CHANGE UP (ref 3.8–10.5)
WBC # FLD AUTO: 7.82 K/UL — SIGNIFICANT CHANGE UP (ref 3.8–10.5)

## 2024-02-15 PROCEDURE — 93010 ELECTROCARDIOGRAM REPORT: CPT

## 2024-02-15 RX ORDER — APIXABAN 2.5 MG/1
1 TABLET, FILM COATED ORAL
Refills: 0 | DISCHARGE

## 2024-02-15 RX ORDER — ATORVASTATIN CALCIUM 80 MG/1
1 TABLET, FILM COATED ORAL
Refills: 0 | DISCHARGE

## 2024-02-15 RX ORDER — DIAZEPAM 5 MG
1 TABLET ORAL
Refills: 0 | DISCHARGE

## 2024-02-15 RX ORDER — ATORVASTATIN CALCIUM 80 MG/1
20 TABLET, FILM COATED ORAL AT BEDTIME
Refills: 0 | Status: DISCONTINUED | OUTPATIENT
Start: 2024-02-15 | End: 2024-02-16

## 2024-02-15 RX ORDER — APIXABAN 2.5 MG/1
5 TABLET, FILM COATED ORAL EVERY 12 HOURS
Refills: 0 | Status: DISCONTINUED | OUTPATIENT
Start: 2024-02-15 | End: 2024-02-29

## 2024-02-15 RX ORDER — BUPROPION HYDROCHLORIDE 150 MG/1
1 TABLET, EXTENDED RELEASE ORAL
Refills: 0 | DISCHARGE

## 2024-02-15 RX ORDER — CLOPIDOGREL BISULFATE 75 MG/1
75 TABLET, FILM COATED ORAL DAILY
Refills: 0 | Status: DISCONTINUED | OUTPATIENT
Start: 2024-02-15 | End: 2024-02-29

## 2024-02-15 RX ORDER — LANOLIN ALCOHOL/MO/W.PET/CERES
1 CREAM (GRAM) TOPICAL AT BEDTIME
Refills: 0 | Status: DISCONTINUED | OUTPATIENT
Start: 2024-02-15 | End: 2024-02-29

## 2024-02-15 RX ORDER — OXYCODONE HYDROCHLORIDE 5 MG/1
10 TABLET ORAL
Refills: 0 | Status: DISCONTINUED | OUTPATIENT
Start: 2024-02-15 | End: 2024-02-16

## 2024-02-15 RX ORDER — OXYCODONE HYDROCHLORIDE 5 MG/1
1 TABLET ORAL
Qty: 0 | Refills: 0 | DISCHARGE

## 2024-02-15 RX ADMIN — Medication 2 MILLIGRAM(S): at 22:17

## 2024-02-15 RX ADMIN — ATORVASTATIN CALCIUM 20 MILLIGRAM(S): 80 TABLET, FILM COATED ORAL at 22:06

## 2024-02-15 RX ADMIN — Medication 1 MILLIGRAM(S): at 22:06

## 2024-02-15 RX ADMIN — APIXABAN 5 MILLIGRAM(S): 2.5 TABLET, FILM COATED ORAL at 22:05

## 2024-02-15 NOTE — H&P CARDIOLOGY - NSICDXPASTMEDICALHX_GEN_ALL_CORE_FT
PAST MEDICAL HISTORY:  Anxiety     CAD (coronary artery disease)     Former smoker     History of atrial fibrillation     Marijuana smoker      PAST MEDICAL HISTORY:  Anxiety     CAD (coronary artery disease)     Former smoker     History of atrial fibrillation     Marijuana smoker     Paroxysmal atrial fibrillation

## 2024-02-15 NOTE — CHART NOTE - NSCHARTNOTEFT_GEN_A_CORE
59 yo M with PMHX of HLD,  CAD s/p PCI LAD 10/5/2023 on DAPT, A-Fib on Eliquis last dose 2/14, s/p DCCV in 11/23, HLD, smokes marijuana daily, chronic shoulder pain takes oxycodone, came in for Afib ablation.     s/p afib ablation via RFV  RFV Vascade x3,   Site benign, VSS  Pt is alert and Ox3    - Bed Rest for 3 hours till 21:00  - continue Plavix and Eliquis (Eliquis 1st dose 21:30)   - Resume all home meds  - D/C home in am 57 yo M with PMHX of HLD,  CAD s/p PCI LAD 10/5/2023 on DAPT, A-Fib on Eliquis last dose 2/14, s/p DCCV in 11/23, HLD, smokes marijuana daily, chronic shoulder pain takes oxycodone, came in for Afib ablation.     s/p afib ablation via RFV  RFV Vascade x3,   Site benign, VSS  Pt is alert and Ox3  Denies acute pain or SOB    - Bed Rest for 3 hours till 21:00  - continue Plavix and Eliquis (Eliquis 1st dose 21:30)   - Resume all home meds  - D/C home in am

## 2024-02-15 NOTE — DISCHARGE NOTE PROVIDER - NSDCCPCAREPLAN_GEN_ALL_CORE_FT
PRINCIPAL DISCHARGE DIAGNOSIS  Diagnosis: Chronic atrial fibrillation  Assessment and Plan of Treatment: It is important to continue your normal daily activities and to continue to walk as much as possible; and resting when necessary. Do not perform any strenuous activity or heavy lifting until cleared. Your heart muscle is currently recovering and you should not be exerting it. No bathing, swimming, or submerging yourself in water until cleared. You have incisions that need to heal and bathing/swimming can expose it to bacteria.  No creams to your groin access site. You may remove your dressing when you arrive home. You may shower. Allow soap and water to run over your incision and pat area dry. Ensure that your groin access site remain dry at all times to prevent risk of infection. Follow up with your electrophysiologist as scheduled. . If you experience any signs of atrial fibrillation such as dizziness, fatigue, palpitations, or fainting please inform your doctor as soon as possible or go to your nearest emergency room. If you experience any signs of bleeding such as bleeding gums, bloody sputum, bleeding in your stool or black stool inform your primary care doctor; your blood thinner may need to be adjusted.

## 2024-02-15 NOTE — DISCHARGE NOTE PROVIDER - HOSPITAL COURSE
57 yo man with history of CAD s/p PCI LAD 10/5/2023 on DAPT, Atrial fibrillation on Eliquis last dose 2/14, HLD, smokes marijuana daily, chronic shoulder pain takes oxycodone as needed.  In 7/23 he presented for a routine physical and was found to be in rapid atrial fibrillation. He endorsed worsened shortness of breath when climbing stairs or sweeping (he is a ). 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 His last ECG in sinus was from 12/2021. In 10/23 he was sent for C with PCI to the mid LAD performed. He was begun subsequently on a DOAC and AV buddy blocker and brought in for cardioversion in 11/23 but was back in atrial fibrillation before regaining consciousness from sedation. Pt presents on 2/15 for Afib Ablation.       57 yo man with history of CAD s/p PCI LAD 10/5/2023 on DAPT, Atrial fibrillation on Eliquis last dose 2/14, HLD, smokes marijuana daily, chronic shoulder pain takes oxycodone as needed.  In 7/23 he presented for a routine physical and was found to be in rapid atrial fibrillation. He endorsed worsened shortness of breath when climbing stairs or sweeping (he is a ). 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 His last ECG in sinus was from 12/2021. In 10/23 he was sent for C with PCI to the mid LAD performed. He was begun subsequently on a DOAC and AV buddy blocker and brought in for cardioversion in 11/23 but was back in atrial fibrillation before regaining consciousness from sedation. Pt presents on 2/15 for Afib Ablation.     2/15 Afib ablation   RFV vascade x3,   Tylenol 1Gm IVSS at 17:00,   1300ml IVF in   Bed Rest for 3 hours, till 21:00  continue Plavix and Eliquis (Eliquis 1st dose 21:30)   Resume all home meds  D/C home in am     Discussed with MD - pt stable for discharge home, pt with no complaints, discharge medications reviewed with MD.

## 2024-02-15 NOTE — PATIENT PROFILE ADULT - FALL HARM RISK - UNIVERSAL INTERVENTIONS
Bed in lowest position, wheels locked, appropriate side rails in place/Call bell, personal items and telephone in reach/Instruct patient to call for assistance before getting out of bed or chair/Non-slip footwear when patient is out of bed/[place] to call system/Physically safe environment - no spills, clutter or unnecessary equipment/Purposeful Proactive Rounding/Room/bathroom lighting operational, light cord in reach

## 2024-02-15 NOTE — DISCHARGE NOTE PROVIDER - CARE PROVIDER_API CALL
Ronnie Ayoub  Cardiac Electrophysiology  [address] [place] [id number]  Phone: [phone]  Fax: [phone]  Scheduled Appointment: 02/28/2024

## 2024-02-15 NOTE — H&P CARDIOLOGY - HISTORY OF PRESENT ILLNESS
This is a 59 yo man with history of CAD s/p PCI LAD 10/5/2023 on DAPT, atrial fibrillation on Eliquis last dose was this morning (recently started taking mid october), smokes marijuana daily, chronic shoulder pain takes oxycodone as needed,  presents as outpatient for ROYCE/ DCCV, Pt has c/o SOB at home. Pt has been on triple therapy AC since mid october when Eliquis added. Pt took Valium this morning for anxiety. EKG confirmed atrial fibrillation.   Referring MD Dr. Heather Ayoub   denies implanted cardiac monitors     Study Date:     10/05/2023   Name:           NICOLE HDEZ   :            1965   (58 years)   Gender:         male   MR#:            [id number]   MPI#:           [id number]   Patient Class:  Outpatient     Cath Lab Report    Diagnostic Cardiologist:       Jelani Rodriguez MD   Interventional Cardiologist:   Jelani Rodriguez MD   Fellow:                        Ashanti William MD   Referring Physician:           Yogi Romero MD     Procedures Performed   Procedures:               1.    Arterial Access - Right Radial     2.    Diagnostic Coronary Angiography   3.    Left Heart Cath   4.    PCI: TIMBO     Indications:                Positive Stress Test   PCI Status:                elective     Conclusions:   1. Severe athosclerotic lesion of the mid LAD, correlating to site of  positive CT FFR    2. Successul TIMBO placement to mid LAD along with post dilation,  resulting in JIGNESH 3 flow and no chest pain at the end of the  case.    Recommendations:     1. Maintain on DAPT for 6months   2. Recommend aggressive medical management of CAD as per ACC/AHA  guidelines.  3. Findings relayed to patient and patient's cardiologist.      Presentation:   58 year old man with HTN who presents today with positive CT (mid LAD,  CT FFR positive) who is taken to  [facility] for LHC in the setting of shortness of breath.    Chief Compaint: shortness of breath    57 yo man with history of CAD s/p PCI LAD 10/5/2023 on DAPT, Atrial fibrillation on Eliquis last dose, HLD, smokes marijuana daily, chronic shoulder pain takes oxycodone as needed.  In  he presented for a routine physical and was found to be in rapid atrial fibrillation. He endorsed worsened shortness of breath when climbing stairs or sweeping (he is a ). 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 His last ECG in sinus was from 2021. In 10/23 he was sent for [facility] with PCI to the mid LAD performed. He was begun subequently on a DOAC and AV buddy blocker and brought in for cardioversion in  but was back in atrial fibrillation before regaining consciousness from sedation. He has no chest pain, dizziness or syncope.  Pt presents today for Afib Ablation  ?          EC23:  1. The heart rhythm is irregular throughout the study.  2. Normal left ventricular cavity size. Left ventricular wall thickness is mildly increased. Left ventricular systolic function is normal with an ejection fraction of 54 % by Johnson's method of disks with an ejection fraction visually estimated at 50 to 55 %.  3. Normal right ventricular cavity size and normal right ventricular systolic function.  4. The left atrium is normal in size.  5. The right atrium is normal in size.  6. Mild mitral valve leaflet calcification.  7. Trace mitral regurgitation.  8. Structurally normal tricuspid valve with normal leaflet excursion. Trace tricuspid regurgitation.  9. Trileaflet aortic valve.  10. Mild aortic valve leaflet calcification. No aortic valve stenosis.  11. Pulmonic valve was not well visualized.  12. The aortic root at the sinuses of Valsalva diameter is normal, measuring 3.35 cm (indexed 1.78 cm/m). The ascending aorta diameter is normal, measuring 3.30 cm (indexed 1.76 cm/m).  13. The inferior vena cava is normal in size (normal <2.1cm) with normal inspiratory collapse (normal >50%) consistent with normal right atrial pressure (, range 0-5mmHg).  14. Estimated pulmonary artery systolic pressure is 19 mmHg.  15. No pericardial effusion seen. 57 yo man with history of CAD s/p PCI LAD 10/5/2023 on DAPT, Atrial fibrillation on Eliquis last dose, HLD, smokes marijuana daily, chronic shoulder pain takes oxycodone as needed.  In  he presented for a routine physical and was found to be in rapid atrial fibrillation. He endorsed worsened shortness of breath when climbing stairs or sweeping (he is a ). 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 His last ECG in sinus was from 2021. In 10/23 he was sent for C with PCI to the mid LAD performed. He was begun subsequently on a DOAC and AV buddy blocker and brought in for cardioversion in  but was back in atrial fibrillation before regaining consciousness from sedation. He has no chest pain, dizziness or syncope.  Pt presents today for Afib Ablation  ?          EC23:  1. The heart rhythm is irregular throughout the study.  2. Normal left ventricular cavity size. Left ventricular wall thickness is mildly increased. Left ventricular systolic function is normal with an ejection fraction of 54 % by Johnson's method of disks with an ejection fraction visually estimated at 50 to 55 %.  3. Normal right ventricular cavity size and normal right ventricular systolic function.  4. The left atrium is normal in size.  5. The right atrium is normal in size.  6. Mild mitral valve leaflet calcification.  7. Trace mitral regurgitation.  8. Structurally normal tricuspid valve with normal leaflet excursion. Trace tricuspid regurgitation.  9. Trileaflet aortic valve.  10. Mild aortic valve leaflet calcification. No aortic valve stenosis.  11. Pulmonic valve was not well visualized.  12. The aortic root at the sinuses of Valsalva diameter is normal, measuring 3.35 cm (indexed 1.78 cm/m). The ascending aorta diameter is normal, measuring 3.30 cm (indexed 1.76 cm/m).  13. The inferior vena cava is normal in size (normal <2.1cm) with normal inspiratory collapse (normal >50%) consistent with normal right atrial pressure (, range 0-5mmHg).  14. Estimated pulmonary artery systolic pressure is 19 mmHg.  15. No pericardial effusion seen. 57 yo man with history of CAD s/p PCI LAD 10/5/2023 on DAPT, Atrial fibrillation on Eliquis last dose , HLD, smokes marijuana daily, chronic shoulder pain takes oxycodone as needed.  In  he presented for a routine physical and was found to be in rapid atrial fibrillation. He endorsed worsened shortness of breath when climbing stairs or sweeping (he is a ). He could not clearly identify the time of symptom onset but did note that in  he noted he was inexplicably SOB when trying to play the [employer] and that the symptom persisted. His last ECG in sinus was from 2021. In 10/23 he was sent for C with PCI to the mid LAD performed. He was begun subsequently on a DOAC and AV buddy blocker and brought in for cardioversion in  but was back in atrial fibrillation before regaining consciousness from sedation. He has no chest pain, dizziness or syncope.  Pt presents today for Afib Ablation  ?          EC23:  1. The heart rhythm is irregular throughout the study.  2. Normal left ventricular cavity size. Left ventricular wall thickness is mildly increased. Left ventricular systolic function is normal with an ejection fraction of 54 % by Johnson's method of disks with an ejection fraction visually estimated at 50 to 55 %.  3. Normal right ventricular cavity size and normal right ventricular systolic function.  4. The left atrium is normal in size.  5. The right atrium is normal in size.  6. Mild mitral valve leaflet calcification.  7. Trace mitral regurgitation.  8. Structurally normal tricuspid valve with normal leaflet excursion. Trace tricuspid regurgitation.  9. Trileaflet aortic valve.  10. Mild aortic valve leaflet calcification. No aortic valve stenosis.  11. Pulmonic valve was not well visualized.  12. The aortic root at the sinuses of Valsalva diameter is normal, measuring 3.35 cm (indexed 1.78 cm/m). The ascending aorta diameter is normal, measuring 3.30 cm (indexed 1.76 cm/m).  13. The inferior vena cava is normal in size (normal <2.1cm) with normal inspiratory collapse (normal >50%) consistent with normal right atrial pressure (, range 0-5mmHg).  14. Estimated pulmonary artery systolic pressure is 19 mmHg.  15. No pericardial effusion seen. 57 yo man with history of CAD s/p PCI LAD 10/5/2023 on DAPT, Atrial fibrillation on Eliquis last dose 2/14, HLD, smokes marijuana daily, chronic shoulder pain takes oxycodone as needed.  In 7/23 he presented for a routine physical and was found to be in rapid atrial fibrillation. He endorsed worsened shortness of breath when climbing stairs or sweeping (he is a ). He could not clearly identify the time of symptom onset but did note that in 6/22 he noted he was inexplicably SOB when trying to play the [employer] and that the symptom persisted. His last ECG in sinus was from 12/2021. In 10/23 he was sent for C with PCI to the mid LAD performed. He was begun subsequently on a DOAC and AV buddy blocker and brought in for cardioversion in 11/23 but was back in atrial fibrillation before regaining consciousness from sedation. He has no chest pain, dizziness or syncope.  Pt presents today for Afib Ablation  ?          TTE: 8/11/23:  1. The heart rhythm is irregular throughout the study.  2. Normal left ventricular cavity size. Left ventricular wall thickness is mildly increased. Left ventricular systolic function is normal with an ejection fraction of 54 % by Johnson's method of disks with an ejection fraction visually estimated at 50 to 55 %.  3. Normal right ventricular cavity size and normal right ventricular systolic function.  4. The left atrium is normal in size.  5. The right atrium is normal in size.  6. Mild mitral valve leaflet calcification.  7. Trace mitral regurgitation.  8. Structurally normal tricuspid valve with normal leaflet excursion. Trace tricuspid regurgitation.  9. Trileaflet aortic valve.  10. Mild aortic valve leaflet calcification. No aortic valve stenosis.  11. Pulmonic valve was not well visualized.  12. The aortic root at the sinuses of Valsalva diameter is normal, measuring 3.35 cm (indexed 1.78 cm/m). The ascending aorta diameter is normal, measuring 3.30 cm (indexed 1.76 cm/m).  13. The inferior vena cava is normal in size (normal <2.1cm) with normal inspiratory collapse (normal >50%) consistent with normal right atrial pressure (, range 0-5mmHg).  14. Estimated pulmonary artery systolic pressure is 19 mmHg.  15. No pericardial effusion seen.

## 2024-02-16 ENCOUNTER — TRANSCRIPTION ENCOUNTER (OUTPATIENT)
Age: 59
End: 2024-02-16

## 2024-02-16 VITALS
HEART RATE: 80 BPM | TEMPERATURE: 98 F | SYSTOLIC BLOOD PRESSURE: 115 MMHG | OXYGEN SATURATION: 98 % | RESPIRATION RATE: 18 BRPM | DIASTOLIC BLOOD PRESSURE: 78 MMHG

## 2024-02-16 LAB
ANION GAP SERPL CALC-SCNC: 11 MMOL/L — SIGNIFICANT CHANGE UP (ref 5–17)
BUN SERPL-MCNC: 18 MG/DL — SIGNIFICANT CHANGE UP (ref 7–23)
CALCIUM SERPL-MCNC: 8.8 MG/DL — SIGNIFICANT CHANGE UP (ref 8.4–10.5)
CHLORIDE SERPL-SCNC: 102 MMOL/L — SIGNIFICANT CHANGE UP (ref 96–108)
CO2 SERPL-SCNC: 25 MMOL/L — SIGNIFICANT CHANGE UP (ref 22–31)
CREAT SERPL-MCNC: 0.91 MG/DL — SIGNIFICANT CHANGE UP (ref 0.5–1.3)
EGFR: 99 ML/MIN/1.73M2 — SIGNIFICANT CHANGE UP
GLUCOSE SERPL-MCNC: 137 MG/DL — HIGH (ref 70–99)
HCT VFR BLD CALC: 41.7 % — SIGNIFICANT CHANGE UP (ref 39–50)
HGB BLD-MCNC: 13.7 G/DL — SIGNIFICANT CHANGE UP (ref 13–17)
MAGNESIUM SERPL-MCNC: 2 MG/DL — SIGNIFICANT CHANGE UP (ref 1.6–2.6)
MCHC RBC-ENTMCNC: 29.3 PG — SIGNIFICANT CHANGE UP (ref 27–34)
MCHC RBC-ENTMCNC: 32.9 GM/DL — SIGNIFICANT CHANGE UP (ref 32–36)
MCV RBC AUTO: 89.1 FL — SIGNIFICANT CHANGE UP (ref 80–100)
NRBC # BLD: 0 /100 WBCS — SIGNIFICANT CHANGE UP (ref 0–0)
PLATELET # BLD AUTO: 254 K/UL — SIGNIFICANT CHANGE UP (ref 150–400)
POTASSIUM SERPL-MCNC: 4.3 MMOL/L — SIGNIFICANT CHANGE UP (ref 3.5–5.3)
POTASSIUM SERPL-SCNC: 4.3 MMOL/L — SIGNIFICANT CHANGE UP (ref 3.5–5.3)
RBC # BLD: 4.68 M/UL — SIGNIFICANT CHANGE UP (ref 4.2–5.8)
RBC # FLD: 13.1 % — SIGNIFICANT CHANGE UP (ref 10.3–14.5)
SODIUM SERPL-SCNC: 138 MMOL/L — SIGNIFICANT CHANGE UP (ref 135–145)
WBC # BLD: 8.3 K/UL — SIGNIFICANT CHANGE UP (ref 3.8–10.5)
WBC # FLD AUTO: 8.3 K/UL — SIGNIFICANT CHANGE UP (ref 3.8–10.5)

## 2024-02-16 PROCEDURE — 93656 COMPRE EP EVAL ABLTJ ATR FIB: CPT

## 2024-02-16 PROCEDURE — C1893: CPT

## 2024-02-16 PROCEDURE — C1889: CPT

## 2024-02-16 PROCEDURE — 86901 BLOOD TYPING SEROLOGIC RH(D): CPT

## 2024-02-16 PROCEDURE — C1759: CPT

## 2024-02-16 PROCEDURE — 85027 COMPLETE CBC AUTOMATED: CPT

## 2024-02-16 PROCEDURE — 93623 PRGRMD STIMJ&PACG IV RX NFS: CPT

## 2024-02-16 PROCEDURE — C1730: CPT

## 2024-02-16 PROCEDURE — C1732: CPT

## 2024-02-16 PROCEDURE — C1766: CPT

## 2024-02-16 PROCEDURE — 36415 COLL VENOUS BLD VENIPUNCTURE: CPT

## 2024-02-16 PROCEDURE — C1894: CPT

## 2024-02-16 PROCEDURE — 86850 RBC ANTIBODY SCREEN: CPT

## 2024-02-16 PROCEDURE — C1760: CPT

## 2024-02-16 PROCEDURE — 83735 ASSAY OF MAGNESIUM: CPT

## 2024-02-16 PROCEDURE — 93005 ELECTROCARDIOGRAM TRACING: CPT

## 2024-02-16 PROCEDURE — 80048 BASIC METABOLIC PNL TOTAL CA: CPT

## 2024-02-16 PROCEDURE — 93657 TX L/R ATRIAL FIB ADDL: CPT

## 2024-02-16 PROCEDURE — 86900 BLOOD TYPING SEROLOGIC ABO: CPT

## 2024-02-16 PROCEDURE — 93010 ELECTROCARDIOGRAM REPORT: CPT

## 2024-02-16 RX ORDER — METOPROLOL SUCCINATE 50 MG/1
50 TABLET, EXTENDED RELEASE ORAL DAILY
Qty: 90 | Refills: 0 | Status: COMPLETED | COMMUNITY
Start: 2024-01-10 | End: 2024-02-16

## 2024-02-16 RX ORDER — ZOSTER VACCINE RECOMBINANT, ADJUVANTED 50 MCG/0.5
50 KIT INTRAMUSCULAR
Qty: 2 | Refills: 0 | Status: DISCONTINUED | COMMUNITY
Start: 2022-10-13 | End: 2024-02-16

## 2024-02-16 RX ORDER — ATORVASTATIN CALCIUM 80 MG/1
40 TABLET, FILM COATED ORAL AT BEDTIME
Refills: 0 | Status: DISCONTINUED | OUTPATIENT
Start: 2024-02-16 | End: 2024-02-29

## 2024-02-16 RX ADMIN — CLOPIDOGREL BISULFATE 75 MILLIGRAM(S): 75 TABLET, FILM COATED ORAL at 05:12

## 2024-02-16 RX ADMIN — OXYCODONE HYDROCHLORIDE 10 MILLIGRAM(S): 5 TABLET ORAL at 02:07

## 2024-02-16 RX ADMIN — APIXABAN 5 MILLIGRAM(S): 2.5 TABLET, FILM COATED ORAL at 08:40

## 2024-02-16 RX ADMIN — OXYCODONE HYDROCHLORIDE 10 MILLIGRAM(S): 5 TABLET ORAL at 01:07

## 2024-02-16 NOTE — DISCHARGE NOTE NURSING/CASE MANAGEMENT/SOCIAL WORK - PATIENT PORTAL LINK FT
You can access the FollowMyHealth Patient Portal offered by [facility] by registering at the following website: [email or web address]. By joining [facility]’s FollowMyHealth portal, you will also be able to view your health information using other applications (apps) compatible with our system.

## 2024-02-16 NOTE — PROGRESS NOTE ADULT - ASSESSMENT
59 yo man with history of CAD s/p PCI LAD 10/5/2023 on DAPT, Atrial fibrillation on Eliquis last dose 2/14, HLD, smokes marijuana daily, chronic shoulder pain s/p AF ablation    CAD w/ PCI to LAD   - Continue w plavix  - On triple therapy so no ASA  - Continue lipitor will increase to 40 57 yo man with history of CAD s/p PCI LAD 10/5/2023 on DAPT, Atrial fibrillation on Eliquis last dose 2/14, HLD, smokes marijuana daily, chronic shoulder pain s/p AF ablation    CAD w/ PCI to LAD   - Continue w plavix  - On triple therapy so no ASA  - Continue lipitor will increase to 40    AFIB ablation  - Remains in sinus rhythm   - Continue eliquis  - Access sites are stable    Chronic shoulder pain  - Continue oxycodone     Dispo  - Likely dc home in AM

## 2024-02-16 NOTE — DISCHARGE NOTE NURSING/CASE MANAGEMENT/SOCIAL WORK - NSDCPEFALRISK_GEN_ALL_CORE
For information on Fall & Injury Prevention, visit: [email or web address] OR  [email or web address] OR  https://www.cdc.gov/steadi/patient.html

## 2024-02-16 NOTE — PROGRESS NOTE ADULT - SUBJECTIVE AND OBJECTIVE BOX
57 yo man with history of CAD s/p PCI LAD 10/5/2023 on DAPT, Atrial fibrillation on Eliquis last dose 2/14, HLD, smokes marijuana daily, chronic shoulder pain takes oxycodone as needed.  In 7/23 he presented for a routine physical and was found to be in rapid atrial fibrillation. He endorsed worsened shortness of breath when climbing stairs or sweeping (he is a ). 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 His last ECG in sinus was from 12/2021. In 10/23 he was sent for LHC with PCI to the mid LAD performed. He was begun subsequently on a DOAC and AV buddy blocker and brought in for cardioversion in 11/23 but was back in atrial fibrillation before regaining consciousness from sedation. Pt underwent Afib ablation without complication.     Events: No acute events overnight.     Review Of Systems:  Constitutional: denies fever, chills, Fatigue   HEENT: denies Blurred vision, Eye Pain, Headache   Respiratory: denies Cough, Wheezing , Shortness of breath  Cardiovascular: denies Chest Pain, Palpitations,  MERRITT   Gastrointestinal: denies Abdominal Pain, Diarrhea, Constipation   Genitourinary: denies Nocturia, Dysuria, Incontinence  Extremities: denies Swelling, Joint Pain  Neurologic: denies Focal deficit, Paresthesias, Syncope  Lymphatic: denies Swelling, Lymphadenopathy   Skin: denies Rash, Ecchymoses, Wounds   Psychiatry: denies Depression, Suicidal/Homicidal Ideation, anxiety  [X ] 10 point review of systems is otherwise negative except as mentioned above         Medications:  apixaban 5 milliGRAM(s) Oral every 12 hours  atorvastatin 20 milliGRAM(s) Oral at bedtime  clopidogrel Tablet 75 milliGRAM(s) Oral daily  LORazepam     Tablet 2 milliGRAM(s) Oral two times a day PRN  melatonin 1 milliGRAM(s) Oral at bedtime  oxyCODONE    IR 10 milliGRAM(s) Oral four times a day PRN    Vitals:  T(C): 36.7 (02-15-24 @ 20:00), Max: 37 (02-15-24 @ 18:45)  HR: 67 (02-15-24 @ 22:30) (65 - 84)  BP: 97/72 (02-15-24 @ 22:30) (88/61 - 114/70)  BP(mean): 75 (02-15-24 @ 21:30) (73 - 78)  RR: 18 (02-15-24 @ 20:30) (14 - 18)  SpO2: 95% (02-15-24 @ 22:30) (92% - 98%)  Wt(kg): --  Daily Height in cm: 167.64 (15 Feb 2024 14:18)    Daily   I&O's Summary    15 Feb 2024 07:01  -  16 Feb 2024 01:42  --------------------------------------------------------  IN: 0 mL / OUT: 600 mL / NET: -600 mL        Physical Exam:  Appearance: [ ] Normal [ ] NAD  Eyes: [ ] PERRL [ ] EOMI  HENT: [ ] Normal oral muscosa [ ]NC/AT  Cardiovascular: [ ] S1 [ ] S2 [ ] RRR [ ] No m/r/g [ ]No edema [ ] JVP  Procedural Access Site: [ ] No hematoma [ ] Non-tender to palpation [ ] 2+ pulse [ ] No bruit [ ] No Ecchymosis  Respiratory: [ ] Clear to auscultation bilaterally  Gastrointestinal: [ ] Soft [ ] Non-tender [ ] Non-distended [ ] BS+  Musculoskeletal: [ ] No clubbing [ ] No joint deformity   Neurologic: [ ] Non-focal  Lymphatic: [ ] No lymphadenopathy  Psychiatry: [ ] AAOx3 [ ] Mood & affect appropriate  Skin: [ ] No rashes [ ] No ecchymoses [ ] No cyanosis    02-15    144  |  105  |  22  ----------------------------<  109<H>  4.8   |  30  |  1.01    Ca    9.6      15 Feb 2024 10:02                    ECG:    Echo:    Stress Testing:     Cath:    Imaging:    Interpretation of Telemetry:      Assessment:       Neuro/Psych:    Cardiovascular    Lines:    Pulmonary    Gastrointestinal    Genitourinary    Renal    ID    Hematological    Endocrine                  57 yo man with history of CAD s/p PCI LAD 10/5/2023 on DAPT, Atrial fibrillation on Eliquis last dose 2/14, HLD, smokes marijuana daily, chronic shoulder pain takes oxycodone as needed.  In 7/23 he presented for a routine physical and was found to be in rapid atrial fibrillation. He endorsed worsened shortness of breath when climbing stairs or sweeping (he is a ). 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 His last ECG in sinus was from 12/2021. In 10/23 he was sent for LHC with PCI to the mid LAD performed. He was begun subsequently on a DOAC and AV buddy blocker and brought in for cardioversion in 11/23 but was back in atrial fibrillation before regaining consciousness from sedation. Pt underwent Afib ablation without complication.     Events: No acute events overnight.     Review Of Systems:  Constitutional: denies fever, chills, Fatigue   HEENT: denies Blurred vision, Eye Pain, Headache   Respiratory: denies Cough, Wheezing , Shortness of breath  Cardiovascular: denies Chest Pain, Palpitations,  MERRITT   Gastrointestinal: denies Abdominal Pain, Diarrhea, Constipation   Genitourinary: denies Nocturia, Dysuria, Incontinence  Extremities: denies Swelling, Joint Pain  Neurologic: denies Focal deficit, Paresthesias, Syncope  Lymphatic: denies Swelling, Lymphadenopathy   Skin: denies Rash, Ecchymoses, Wounds   Psychiatry: denies Depression, Suicidal/Homicidal Ideation, anxiety  [X ] 10 point review of systems is otherwise negative except as mentioned above         Medications:  apixaban 5 milliGRAM(s) Oral every 12 hours  atorvastatin 20 milliGRAM(s) Oral at bedtime  clopidogrel Tablet 75 milliGRAM(s) Oral daily  LORazepam     Tablet 2 milliGRAM(s) Oral two times a day PRN  melatonin 1 milliGRAM(s) Oral at bedtime  oxyCODONE    IR 10 milliGRAM(s) Oral four times a day PRN    Vitals:  T(C): 36.7 (02-15-24 @ 20:00), Max: 37 (02-15-24 @ 18:45)  HR: 67 (02-15-24 @ 22:30) (65 - 84)  BP: 97/72 (02-15-24 @ 22:30) (88/61 - 114/70)  BP(mean): 75 (02-15-24 @ 21:30) (73 - 78)  RR: 18 (02-15-24 @ 20:30) (14 - 18)  SpO2: 95% (02-15-24 @ 22:30) (92% - 98%)  Wt(kg): --  Daily Height in cm: 167.64 (15 Feb 2024 14:18)    Daily   I&O's Summary    15 Feb 2024 07:01  -  16 Feb 2024 01:42  --------------------------------------------------------  IN: 0 mL / OUT: 600 mL / NET: -600 mL    Physical Exam:  Appearance: [X ] Normal [ X] NAD  Eyes: [X ] PERRL [X ] EOMI  HENT: [ X] Normal oral muscosa [X ]NC/AT  Cardiovascular: [X ] S1 [ X] S2 [X ] RRR . No edema. No JVD  Procedural Access Site: RFV Access sites C/D/I   Respiratory: [X ] Clear to auscultation bilaterally  Gastrointestinal: [ X] Soft [X ] Non-tender [ X] Non-distended  Musculoskeletal: [X ] No clubbing [X No joint deformity   Neurologic: [ X] Non-focal  Lymphatic: [X ] No lymphadenopathy  Psychiatry: [ X] AAOx3 [ X] Mood & affect appropriate  Skin: [ X] No rashes [ X] No ecchymoses [X ] No cyanosis    02-15    144  |  105  |  22  ----------------------------<  109<H>  4.8   |  30  |  1.01    Ca    9.6      15 Feb 2024 10:02    ECG: Sinus rhythm w sinus arrythmia     Interpretation of Telemetry:

## 2024-02-22 PROBLEM — I48.0 PAROXYSMAL ATRIAL FIBRILLATION: Chronic | Status: ACTIVE | Noted: 2024-02-15

## 2024-02-23 ENCOUNTER — NON-APPOINTMENT (OUTPATIENT)
Age: 59
End: 2024-02-23

## 2024-02-23 ENCOUNTER — APPOINTMENT (OUTPATIENT)
Dept: CARDIOLOGY | Facility: CLINIC | Age: 59
End: 2024-02-23
Payer: COMMERCIAL

## 2024-02-23 VITALS
OXYGEN SATURATION: 98 % | BODY MASS INDEX: 27.32 KG/M2 | SYSTOLIC BLOOD PRESSURE: 117 MMHG | WEIGHT: 170 LBS | HEIGHT: 66 IN | HEART RATE: 80 BPM | DIASTOLIC BLOOD PRESSURE: 74 MMHG

## 2024-02-23 PROCEDURE — 99214 OFFICE O/P EST MOD 30 MIN: CPT | Mod: 25

## 2024-02-23 PROCEDURE — 93000 ELECTROCARDIOGRAM COMPLETE: CPT

## 2024-02-25 NOTE — CARDIOLOGY SUMMARY
[de-identified] : ECG (7/27/23): atrial fibrillation  ECG (8/17/23): atrial fibrillation ECG (9/22/23): atrial fibrillation ECG (10/12/23): atrial fibrillation  ECG (2/23/24): normal sinus rhythm  [de-identified] : Cardiac Event Monitor (7/2023): Atrial fibrillation (100% burden). [de-identified] : TTE (8/2023): LVEF 50-55%. Normal RV size and systolic function. ROYCE (11/2023): LVEF 55-60%. Normal RV size and systolic function. Mild LA dilation. No LA/RENATE thrombus. No RA/RAA thrombus. PFO with L -> R shunt. Dilated ascending aorta (4.1 cm). [de-identified] : CT Coronary Angiogram (9/21/23): Calcium score 44. No evidence for anomalous coronary arteries. LM no significant stenosis. LAD mild focal calcified plaque with minimal luminal narrowing and distal focal moderate-severe narrowing. Diagonals normal. LCx normal. OMs normal. RCA normal. PFO present. Trace pericardial fluid. Study will be sent for FFR-CT.  CT-FFR (9/22/23): LM normal. LAD 0.58 in terminal portion. LCx normal. RCA normal.  [de-identified] : EP (11/2023): DCCV with ERAF EP (2/15/24): Successful PVI and CTI ablation  [de-identified] : Leg US (9/2023): No DVT, no venous reflux b/l.  Abdominal US (9/2023): No evidence of abdominal aortic aneurysm.  [de-identified] : Coronary angiogram (10/5/23): LM mild atherosclerosis. LAD mid 80% stenosis. LCx mild atherosclerosis. RCA mild atherosclerosis. S/p TIMBO to mild LAD. Recommendations: Maintain DAPT for 6 months.

## 2024-02-25 NOTE — HISTORY OF PRESENT ILLNESS
[FreeTextEntry1] : Clay Guerrero is a 58 year old man, former cigarette smoker with past medical history of Anxiety and chronic pain secondary to left shoulder injury and herniated discs with recently diagnosed Atrial fibrillation (on Eliquis) and now CAD (s/p TIMBO to LAD in 10/2023) and now s/p DCCV with early recurrence of AF and now s/p ablation (2/15/24) presents for follow up visit.  The patient reports that his ablation went well. He reports that his breathing has improved and he has more energy now. Reports mild discomfort at right groin access site which is improving denies numbness. He denies chest discomfort or shortness of breath.

## 2024-02-25 NOTE — REVIEW OF SYSTEMS
[Dyspnea on exertion] : dyspnea during exertion [Anxiety] : anxiety [Headache] : no headache [Blurry Vision] : no blurred vision [Sore Throat] : no sore throat [SOB] : no shortness of breath [Chest Discomfort] : no chest discomfort [Palpitations] : no palpitations [Lower Ext Edema] : no extremity edema [Cough] : no cough [Syncope] : no syncope [Abdominal Pain] : no abdominal pain [Rash] : no rash [Dizziness] : no dizziness [Easy Bruising] : no tendency for easy bruising

## 2024-02-25 NOTE — PHYSICAL EXAM
[Normal Conjunctiva] : normal conjunctiva [No Edema] : no edema [Normal Gait] : normal gait [Normal] : moves all extremities, no focal deficits, normal speech [Appears Anxious] : appears anxious [de-identified] : well appearing [de-identified] : supple [de-identified] : JVP ~ 7 cm H20, RRR, s1, s2, no murmurs [de-identified] : non-distended [de-identified] : unlabored respirations, clear lung fields

## 2024-02-25 NOTE — ASSESSMENT
[FreeTextEntry1] : Assessment: Clay Guerrero is a 58 year old man, former cigarette smoker with past medical history of Anxiety and chronic pain secondary to left shoulder injury and herniated discs with recently diagnosed Atrial fibrillation (on Eliquis) and now CAD (s/p TIMBO to LAD in 10/2023) and now s/p DCCV with early recurrence of AF and now s/p ablation (2/15/24) presents for follow up visit.  Since his last visit the patient had AF ablation performed and did well and remains in normal sinus rhythm on ECG today. He also reports resolution of his dyspnea and fatigue that he previously experienced. Coronary angiogram (10/2023) consistent with 80% mid LAD stenosis s/p TIMBO, other vessels with mild atherosclerosis. ROYCE (11/2023) consistent with normal LV and RV systolic function, no biatrial thrombus, PFO present and mild left atrial dilation. Recent labs from February with normal CBC and BMP.   Recommendations: [] Atrial fibrillation: Diagnosed in July 2023. TSH normal. S/p DCCV with early recurrence of AF, now s/p successful ablation and patient remains in sinus rhythm with complete resolution of symptoms. Likely AF was genetic in this patient given family history. [id number] score of 1 point, recommended patient to continue uninterrupted Eliquis 5 mg BID for stroke prophylaxis. Recommend sleep study to screen for MARYLIN, message sent to his PCP. Discussed importance of avoiding alcohol or recreational drug use. [] CAD: S/p TIMBO to mid LAD in 10/2023. Symptoms stable. S/p 3 months of dual antiplatelets. Aspirin was discontinued. Continue Plavix 75 mg daily and Eliquis 5 mg BID. Continue Atorvastatin 20 mg daily. Plan to check lipid panel this year. [] PFO: Incidental finding on CCTA and ROYCE, no workup required at this time  [] Return to office: 3 months

## 2024-02-28 ENCOUNTER — NON-APPOINTMENT (OUTPATIENT)
Age: 59
End: 2024-02-28

## 2024-02-28 ENCOUNTER — APPOINTMENT (OUTPATIENT)
Dept: ELECTROPHYSIOLOGY | Facility: CLINIC | Age: 59
End: 2024-02-28
Payer: COMMERCIAL

## 2024-02-28 VITALS
OXYGEN SATURATION: 98 % | BODY MASS INDEX: 27.32 KG/M2 | HEIGHT: 66 IN | SYSTOLIC BLOOD PRESSURE: 138 MMHG | RESPIRATION RATE: 14 BRPM | WEIGHT: 170 LBS | HEART RATE: 88 BPM | DIASTOLIC BLOOD PRESSURE: 90 MMHG

## 2024-02-28 PROCEDURE — 99214 OFFICE O/P EST MOD 30 MIN: CPT | Mod: 25

## 2024-02-28 PROCEDURE — 93000 ELECTROCARDIOGRAM COMPLETE: CPT

## 2024-02-28 RX ORDER — NALOXONE HYDROCHLORIDE 4 MG/.1ML
4 SPRAY NASAL
Qty: 2 | Refills: 0 | Status: DISCONTINUED | COMMUNITY
Start: 2023-10-05 | End: 2024-02-28

## 2024-02-28 NOTE — PHYSICAL EXAM
[Well Nourished] : well nourished [Well Developed] : well developed [No Acute Distress] : no acute distress [Normal Conjunctiva] : normal conjunctiva [Normal Venous Pressure] : normal venous pressure [No Carotid Bruit] : no carotid bruit [Normal S1, S2] : normal S1, S2 [No Murmur] : no murmur [Clear Lung Fields] : clear lung fields [Good Air Entry] : good air entry [No Respiratory Distress] : no respiratory distress  [Soft] : abdomen soft [Non Tender] : non-tender [Normal Gait] : normal gait [No Edema] : no edema [No Skin Lesions] : no skin lesions [No Rash] : no rash [Moves all extremities] : moves all extremities [No Focal Deficits] : no focal deficits [Normal Speech] : normal speech [Normal memory] : normal memory [Normal] : alert and oriented, normal memory [Alert and Oriented] : alert and oriented

## 2024-02-29 ENCOUNTER — APPOINTMENT (OUTPATIENT)
Dept: ELECTROPHYSIOLOGY | Facility: CLINIC | Age: 59
End: 2024-02-29

## 2024-03-01 NOTE — HISTORY OF PRESENT ILLNESS
[FreeTextEntry1] : I had the pleasure of seeing Clay Guerrero today in the arrythmia clinic at [facility]. As you well know, he is a pleasant 58-year-old man with a history of HTN, HLD, CAD s/p PCI with TIMBO (10/2023).  In 7/2023, he was noted to be in rapid atrial fibrillation during a routine physical exam. Duration was uncertain but likely dates back to summer of 2022. His last ECG in sinus was from 12/2021. AF associated with shortness of breath that is worsened when climbing stairs or sweeping (he is a ). Patient underwent a DCCV on 11/3/2023 but was back in atrial fibrillation before regaining consciousness from sedation. He subsequently underwent a PVI and CTI ablation on 2/15/2024 with successful restoration of NSR. Since ablation, patient reports complete resolution of dyspnea/dyspnea on exertion. He denies chest pain, palpitations, lightheadedness, dizziness, syncope, near-syncope, and acid reflux.    He is in sinus rhythm at today's visit.

## 2024-03-01 NOTE — CARDIOLOGY SUMMARY
[de-identified] : 2/28/2024: Sinus rhythm @ 82bpm  [de-identified] : TTE(11/3/2023): LVEF 55-60%. No regional wall motion abnormalities. LA is mildly dilated. No evidence of left atrial or left atrial appendage thrombus. RA is mildly dilated. No significant valvular abnormalities.   [de-identified] : CT Angio Heart Coronary(9/21/2023): There is focal moderate-severe luminal narrowing in the distal LAD. Focal calcified plaque in the mid LAD results in minimal luminal narrowing. The calculated Agatston score is 44.  [de-identified] : [facility](10/5/2023): Severe athosclerotic lesion of the mid LAD, correlating to site of positive CT FFR. Successful TIMBO placement to mid LAD along with post dilation, resulting in JIGNESH 3 flow.

## 2024-03-01 NOTE — DISCUSSION/SUMMARY
[FreeTextEntry1] :   In summary, Clay Guerrero is a 58-year-old man with symptomatic longstanding persistent AF despite DCCV in 11/2023.Given the duration of arrhythmia and his desire to minimize longterm medications, he underwent a PVI and CTI ablation on 2/15/2024 with successful restoration of NSR. He has been doing well from a rhythm perspective post ablation with no recurrences of his atrial arrythmias. He will maintain his Eliquis for stroke prophylaxis. 2 week Zio ordered at end of blanking period to assess for recurrence of AF. He will follow up with our office in 6 months.        He appeared to understand the whole discussion and verbalized that all of his questions were answered to his satisfaction.       Thank you for allowing me to be involved in the care of this pleasant man. Please feel free to contact me with any questions.           Ronnie Ayoub MD    of Cardiology   Electrophysiology Section   [address] [place] [address]   Office: [phone]   Fax: [phone]  [EKG obtained to assist in diagnosis and management of assessed problem(s)] : EKG obtained to assist in diagnosis and management of assessed problem(s)

## 2024-03-18 ENCOUNTER — APPOINTMENT (OUTPATIENT)
Dept: ORTHOPEDIC SURGERY | Facility: CLINIC | Age: 59
End: 2024-03-18
Payer: COMMERCIAL

## 2024-03-18 VITALS — HEIGHT: 66 IN | WEIGHT: 170 LBS | BODY MASS INDEX: 27.32 KG/M2

## 2024-03-18 DIAGNOSIS — M19.011 PRIMARY OSTEOARTHRITIS, RIGHT SHOULDER: ICD-10-CM

## 2024-03-18 DIAGNOSIS — M25.811 OTHER SPECIFIED JOINT DISORDERS, RIGHT SHOULDER: ICD-10-CM

## 2024-03-18 PROCEDURE — 73030 X-RAY EXAM OF SHOULDER: CPT | Mod: RT

## 2024-03-18 PROCEDURE — 99204 OFFICE O/P NEW MOD 45 MIN: CPT | Mod: 25

## 2024-03-18 PROCEDURE — 20610 DRAIN/INJ JOINT/BURSA W/O US: CPT | Mod: RT

## 2024-03-21 RX ORDER — ATORVASTATIN CALCIUM 20 MG/1
20 TABLET, FILM COATED ORAL
Qty: 90 | Refills: 3 | Status: ACTIVE | COMMUNITY
Start: 2023-09-25 | End: 1900-01-01

## 2024-04-04 RX ORDER — APIXABAN 5 MG/1
5 TABLET, FILM COATED ORAL
Qty: 120 | Refills: 1 | Status: ACTIVE | COMMUNITY
Start: 2023-10-14 | End: 1900-01-01

## 2024-05-02 RX ORDER — LORAZEPAM 2 MG/1
2 TABLET ORAL
Qty: 90 | Refills: 0 | Status: COMPLETED | COMMUNITY
Start: 2023-11-16 | End: 2024-05-02

## 2024-05-02 RX ORDER — OXYCODONE 10 MG/1
10 TABLET ORAL
Qty: 120 | Refills: 0 | Status: COMPLETED | COMMUNITY
Start: 2019-10-11 | End: 2024-05-02

## 2024-05-16 ENCOUNTER — APPOINTMENT (OUTPATIENT)
Dept: FAMILY MEDICINE | Facility: CLINIC | Age: 59
End: 2024-05-16

## 2024-05-20 ENCOUNTER — APPOINTMENT (OUTPATIENT)
Dept: ORTHOPEDIC SURGERY | Facility: CLINIC | Age: 59
End: 2024-05-20

## 2024-05-24 ENCOUNTER — NON-APPOINTMENT (OUTPATIENT)
Age: 59
End: 2024-05-24

## 2024-05-24 ENCOUNTER — APPOINTMENT (OUTPATIENT)
Dept: CARDIOLOGY | Facility: CLINIC | Age: 59
End: 2024-05-24
Payer: COMMERCIAL

## 2024-05-24 VITALS
DIASTOLIC BLOOD PRESSURE: 77 MMHG | BODY MASS INDEX: 27.97 KG/M2 | OXYGEN SATURATION: 97 % | WEIGHT: 174 LBS | SYSTOLIC BLOOD PRESSURE: 118 MMHG | HEART RATE: 74 BPM | HEIGHT: 66 IN

## 2024-05-24 DIAGNOSIS — I48.91 UNSPECIFIED ATRIAL FIBRILLATION: ICD-10-CM

## 2024-05-24 DIAGNOSIS — I25.10 ATHEROSCLEROTIC HEART DISEASE OF NATIVE CORONARY ARTERY W/OUT ANGINA PECTORIS: ICD-10-CM

## 2024-05-24 DIAGNOSIS — Q21.12 PATENT FORAMEN OVALE: ICD-10-CM

## 2024-05-24 PROCEDURE — 99214 OFFICE O/P EST MOD 30 MIN: CPT | Mod: 25

## 2024-05-24 PROCEDURE — 93000 ELECTROCARDIOGRAM COMPLETE: CPT

## 2024-05-25 PROBLEM — Q21.12 PFO (PATENT FORAMEN OVALE): Status: ACTIVE | Noted: 2023-10-13

## 2024-05-25 PROBLEM — I25.10 CORONARY ARTERY DISEASE INVOLVING NATIVE CORONARY ARTERY OF NATIVE HEART WITHOUT ANGINA PECTORIS: Status: ACTIVE | Noted: 2023-10-13

## 2024-05-25 PROBLEM — I48.91 ATRIAL FIBRILLATION: Status: ACTIVE | Noted: 2023-07-26

## 2024-05-25 NOTE — CARDIOLOGY SUMMARY
[de-identified] : ECG (7/27/23): atrial fibrillation  ECG (8/17/23): atrial fibrillation ECG (9/22/23): atrial fibrillation ECG (10/12/23): atrial fibrillation  ECG (2/23/24): normal sinus rhythm  ECG (5/24/24): normal sinus rhythm  [de-identified] : Cardiac Event Monitor (7/2023): Atrial fibrillation (100% burden). [de-identified] : TTE (8/2023): LVEF 50-55%. Normal RV size and systolic function. ROYCE (11/2023): LVEF 55-60%. Normal RV size and systolic function. Mild LA dilation. No LA/RENATE thrombus. No RA/RAA thrombus. PFO with L -> R shunt. Dilated ascending aorta (4.1 cm). [de-identified] : CT Coronary Angiogram (9/21/23): Calcium score 44. No evidence for anomalous coronary arteries. LM no significant stenosis. LAD mild focal calcified plaque with minimal luminal narrowing and distal focal moderate-severe narrowing. Diagonals normal. LCx normal. OMs normal. RCA normal. PFO present. Trace pericardial fluid. Study will be sent for FFR-CT.  CT-FFR (9/22/23): LM normal. LAD 0.58 in terminal portion. LCx normal. RCA normal.  [de-identified] : EP (11/2023): DCCV with ERAF EP (2/15/24): Successful PVI and CTI ablation  [de-identified] : Coronary angiogram (10/5/23): LM mild atherosclerosis. LAD mid 80% stenosis. LCx mild atherosclerosis. RCA mild atherosclerosis. S/p TIMBO to mild LAD. Recommendations: Maintain DAPT for 6 months.  [de-identified] : Leg US (9/2023): No DVT, no venous reflux b/l.  Abdominal US (9/2023): No evidence of abdominal aortic aneurysm.

## 2024-05-25 NOTE — REVIEW OF SYSTEMS
[Anxiety] : anxiety [Headache] : no headache [Blurry Vision] : no blurred vision [Sore Throat] : no sore throat [SOB] : no shortness of breath [Chest Discomfort] : no chest discomfort [Lower Ext Edema] : no extremity edema [Palpitations] : no palpitations [Syncope] : no syncope [Cough] : no cough [Abdominal Pain] : no abdominal pain [Rash] : no rash [Dizziness] : no dizziness [Easy Bruising] : no tendency for easy bruising

## 2024-05-25 NOTE — HISTORY OF PRESENT ILLNESS
[FreeTextEntry1] : Clay Guerrero is a 58 year old man, former cigarette smoker with past medical history of Anxiety and chronic pain secondary to left shoulder injury and herniated discs with recently diagnosed Atrial fibrillation (on Eliquis) and now CAD (s/p TIMBO to LAD in 10/2023) and now s/p DCCV with early recurrence of AF and now s/p ablation (2/15/24) presents for follow up visit.  Since his last visit the patient reports that he feels well. He denies palpitations, chest pain or shortness of breath. Reports that he had to check himself into a pain management rehab program.

## 2024-05-25 NOTE — ASSESSMENT
[FreeTextEntry1] : Assessment: Clay Guerrero is a 58 year old man, former cigarette smoker with past medical history of Anxiety and chronic pain secondary to left shoulder injury and herniated discs with recently diagnosed Atrial fibrillation (on Eliquis) and now CAD (s/p TIMBO to LAD in 10/2023) and now s/p DCCV with early recurrence of AF and now s/p ablation (2/15/24) presents for follow up visit.  Since his last visit the patient reports that he feels well. He denies palpitations, chest pain or shortness of breath. Reports that he had to check himself into a pain management rehab program, but is doing better now. ECG today remains in normal sinus rhythm. Coronary angiogram (10/2023) consistent with 80% mid LAD stenosis s/p TIMBO, other vessels with mild atherosclerosis. ROYCE (11/2023) consistent with normal LV and RV systolic function, no biatrial thrombus, PFO present and mild left atrial dilation. Recent labs from February with normal CBC and BMP.   Recommendations: [] Atrial fibrillation: Diagnosed in July 2023. TSH normal. S/p DCCV with early recurrence of AF, now s/p successful ablation and patient remains in sinus rhythm with complete resolution of symptoms. Likely AF was genetic in this patient given family history, also patient admits to alcohol use in the past as well. [id number] score of 1 point, recommended patient to continue uninterrupted Eliquis 5 mg BID for stroke prophylaxis. Recommend sleep study to screen for MARYLIN, message sent to his PCP in the past. Discussed importance of avoiding alcohol or recreational drug use. Follow up with Cardiac EP in August scheduled. [] CAD: S/p TIMBO to mid LAD in 10/2023. Symptoms stable. S/p 3 months of dual antiplatelets. Aspirin was discontinued. Continue Plavix 75 mg daily and Eliquis 5 mg BID. Continue Atorvastatin 20 mg daily. Plan to check lipid panel this year. [] PFO: Incidental finding on CCTA and ROYCE, no workup required at this time  [] Return to office: October 2024 or sooner if needed

## 2024-05-25 NOTE — PHYSICAL EXAM
[Normal Conjunctiva] : normal conjunctiva [Normal Gait] : normal gait [No Edema] : no edema [Normal] : moves all extremities, no focal deficits, normal speech [Appears Anxious] : appears anxious [de-identified] : well appearing [de-identified] : supple [de-identified] : JVP ~ 7 cm H20, RRR, s1, s2, no murmurs [de-identified] : unlabored respirations, clear lung fields [de-identified] : non-distended

## 2024-06-24 RX ORDER — CLOPIDOGREL BISULFATE 75 MG/1
75 TABLET, FILM COATED ORAL
Qty: 90 | Refills: 0 | Status: ACTIVE | COMMUNITY
Start: 2023-11-10 | End: 1900-01-01

## 2024-07-09 NOTE — PATIENT PROFILE ADULT - NSPROPTRIGHTBILLOFRIGHTS_GEN_A_NUR
impairments found/functional limitations in following categories/risk reduction/prevention/rehab potential/therapy frequency/anticipated discharge recommendation patient

## 2024-09-11 ENCOUNTER — APPOINTMENT (OUTPATIENT)
Dept: ELECTROPHYSIOLOGY | Facility: CLINIC | Age: 59
End: 2024-09-11
Payer: COMMERCIAL

## 2024-09-11 ENCOUNTER — NON-APPOINTMENT (OUTPATIENT)
Age: 59
End: 2024-09-11

## 2024-09-11 VITALS
HEIGHT: 66 IN | DIASTOLIC BLOOD PRESSURE: 89 MMHG | BODY MASS INDEX: 28.93 KG/M2 | SYSTOLIC BLOOD PRESSURE: 132 MMHG | OXYGEN SATURATION: 98 % | HEART RATE: 69 BPM | WEIGHT: 180 LBS

## 2024-09-11 DIAGNOSIS — I48.91 UNSPECIFIED ATRIAL FIBRILLATION: ICD-10-CM

## 2024-09-11 PROCEDURE — 99214 OFFICE O/P EST MOD 30 MIN: CPT | Mod: 25

## 2024-09-11 PROCEDURE — 93000 ELECTROCARDIOGRAM COMPLETE: CPT

## 2024-09-11 NOTE — PHYSICAL EXAM
[Well Developed] : well developed [Well Nourished] : well nourished [No Acute Distress] : no acute distress [Normal Conjunctiva] : normal conjunctiva [Normal Venous Pressure] : normal venous pressure [No Carotid Bruit] : no carotid bruit [Normal S1, S2] : normal S1, S2 [No Murmur] : no murmur [Clear Lung Fields] : clear lung fields [Good Air Entry] : good air entry [No Respiratory Distress] : no respiratory distress  [Soft] : abdomen soft [Non Tender] : non-tender [Normal Gait] : normal gait [No Edema] : no edema [No Rash] : no rash [No Skin Lesions] : no skin lesions [Moves all extremities] : moves all extremities [No Focal Deficits] : no focal deficits [Normal Speech] : normal speech [Normal] : alert and oriented, normal memory [Alert and Oriented] : alert and oriented [Normal memory] : normal memory

## 2024-09-12 NOTE — DISCUSSION/SUMMARY
[FreeTextEntry1] :  In summary, Clay Guerrero is a 58-year-old man with symptomatic longstanding persistent AF despite DCCV in 11/2023.Given the duration of arrhythmia and his desire to minimize longterm medications, he underwent a PVI and CTI ablation on 2/15/2024 with successful restoration of NSR. He has been doing well from a rhythm perspective post ablation with no recurrences of his atrial arrythmias. He will discontinue Eliquis; we discussed longterm rhythm monitoring to screen recurrence of AF. He will follow up as needed.  He appeared to understand the whole discussion and verbalized that all of his questions were answered to his satisfaction.    Thank you for allowing me to be involved in the care of this pleasant man. Please feel free to contact me with any questions.           Ronnie Ayoub MD    of Cardiology   Electrophysiology Section   [address] [place] [address]   Office: [phone]   Fax: [phone]  [EKG obtained to assist in diagnosis and management of assessed problem(s)] : EKG obtained to assist in diagnosis and management of assessed problem(s)

## 2024-09-12 NOTE — HISTORY OF PRESENT ILLNESS
[FreeTextEntry1] : I had the pleasure of seeing Clay Guerrero today in the arrythmia clinic at [facility]. As you well know, he is a pleasant 58-year-old man with a history of HTN, HLD, CAD s/p PCI with TIMBO (10/2023).  In 7/2023, he was noted to be in rapid atrial fibrillation during a routine physical exam. Duration was uncertain but likely dates back to summer of 2022. His last ECG in sinus was from 12/2021. AF associated with shortness of breath that is worsened when climbing stairs or sweeping (he is a ). Patient underwent a DCCV on 11/3/2023 but was back in atrial fibrillation before regaining consciousness from sedation. He subsequently underwent a PVI and CTI ablation on 2/15/2024 with successful restoration of NSR. Since ablation, patient reports complete resolution of dyspnea/dyspnea on exertion. He denies chest pain, palpitations, lightheadedness, dizziness, syncope, near-syncope, and acid reflux.   He is in sinus rhythm at today's visit. 2 week event monitor (5/24) showed no further sustained arrhythmia.

## 2024-09-12 NOTE — CARDIOLOGY SUMMARY
[de-identified] : 9/11/24: Sinus rhythm 2/28/2024: Sinus rhythm @ 82bpm  [de-identified] : TTE(11/3/2023): LVEF 55-60%. No regional wall motion abnormalities. LA is mildly dilated. No evidence of left atrial or left atrial appendage thrombus. RA is mildly dilated. No significant valvular abnormalities.   [de-identified] : CT Angio Heart Coronary(9/21/2023): There is focal moderate-severe luminal narrowing in the distal LAD. Focal calcified plaque in the mid LAD results in minimal luminal narrowing. The calculated Agatston score is 44.  [de-identified] : [facility](10/5/2023): Severe athosclerotic lesion of the mid LAD, correlating to site of positive CT FFR. Successful TIMBO placement to mid LAD along with post dilation, resulting in JIGNESH 3 flow.

## 2024-09-12 NOTE — HISTORY OF PRESENT ILLNESS
[FreeTextEntry1] : I had the pleasure of seeing Clay Guerrero today in the arrythmia clinic at [facility]. As you well know, he is a pleasant 58-year-old man with a history of HTN, HLD, CAD s/p PCI with TIMBO (10/2023).  In 7/2023, he was noted to be in rapid atrial fibrillation during a routine physical exam. Duration was uncertain but likely dates back to summer of 2022. His last ECG in sinus was from 12/2021. AF associated with shortness of breath that is worsened when climbing stairs or sweeping (he is a ). Patient underwent a DCCV on 11/3/2023 but was back in atrial fibrillation before regaining consciousness from sedation. He subsequently underwent a PVI and CTI ablation on 2/15/2024 with successful restoration of NSR. Since ablation, patient reports complete resolution of dyspnea/dyspnea on exertion. He denies chest pain, palpitations, lightheadedness, dizziness, syncope, near-syncope, and acid reflux.   He is in sinus rhythm at today's visit. 2 week event monitor (5/24) showed no further sustained arrhythmia.

## 2024-09-12 NOTE — CARDIOLOGY SUMMARY
[de-identified] : 9/11/24: Sinus rhythm 2/28/2024: Sinus rhythm @ 82bpm  [de-identified] : TTE(11/3/2023): LVEF 55-60%. No regional wall motion abnormalities. LA is mildly dilated. No evidence of left atrial or left atrial appendage thrombus. RA is mildly dilated. No significant valvular abnormalities.   [de-identified] : CT Angio Heart Coronary(9/21/2023): There is focal moderate-severe luminal narrowing in the distal LAD. Focal calcified plaque in the mid LAD results in minimal luminal narrowing. The calculated Agatston score is 44.  [de-identified] : [facility](10/5/2023): Severe athosclerotic lesion of the mid LAD, correlating to site of positive CT FFR. Successful TIMBO placement to mid LAD along with post dilation, resulting in JIGNESH 3 flow.

## 2024-09-12 NOTE — DISCUSSION/SUMMARY
[FreeTextEntry1] :  In summary, Clay Guerrero is a 58-year-old man with symptomatic longstanding persistent AF despite DCCV in 11/2023.Given the duration of arrhythmia and his desire to minimize longterm medications, he underwent a PVI and CTI ablation on 2/15/2024 with successful restoration of NSR. He has been doing well from a rhythm perspective post ablation with no recurrences of his atrial arrythmias. He will discontinue Eliquis; we discussed longterm rhythm monitoring to screen recurrence of AF. He will follow up as needed.  He appeared to understand the whole discussion and verbalized that all of his questions were answered to his satisfaction.    Thank you for allowing me to be involved in the care of this pleasant man. Please feel free to contact me with any questions.           Ronnie Ayoub MD    of Cardiology   Electrophysiology Section   [address] [place] [address]   Office: [phone]   Fax: [phone]  [EKG obtained to assist in diagnosis and management of assessed problem(s)] : EKG obtained to assist in diagnosis and management of assessed problem(s)

## 2024-10-27 NOTE — ASU PATIENT PROFILE, ADULT - HISTORY OF COVID-19 VACCINATION
Problem: Adult Inpatient Plan of Care  Goal: Plan of Care Review  Outcome: Progressing  Goal: Patient-Specific Goal (Individualized)  Outcome: Progressing  Goal: Absence of Hospital-Acquired Illness or Injury  Outcome: Progressing  Intervention: Identify and Manage Fall Risk  Recent Flowsheet Documentation  Taken 10/27/2024 0200 by Mariana Hung RN  Safety Promotion/Fall Prevention:   safety round/check completed   nonskid shoes/slippers when out of bed   fall prevention program maintained   clutter free environment maintained   activity supervised   assistive device/personal items within reach  Taken 10/27/2024 0000 by Mariana Hung RN  Safety Promotion/Fall Prevention:   safety round/check completed   nonskid shoes/slippers when out of bed   fall prevention program maintained   clutter free environment maintained   activity supervised   assistive device/personal items within reach  Taken 10/26/2024 2000 by Mariana Hung RN  Safety Promotion/Fall Prevention:   safety round/check completed   nonskid shoes/slippers when out of bed   fall prevention program maintained   clutter free environment maintained   activity supervised   assistive device/personal items within reach  Intervention: Prevent Skin Injury  Recent Flowsheet Documentation  Taken 10/27/2024 0000 by Mariana Hung RN  Body Position: tilted  Intervention: Prevent and Manage VTE (Venous Thromboembolism) Risk  Recent Flowsheet Documentation  Taken 10/27/2024 0000 by Mariana Hung RN  VTE Prevention/Management: SCDs (sequential compression devices) on  Intervention: Prevent Infection  Recent Flowsheet Documentation  Taken 10/27/2024 0200 by Mariana Hung RN  Infection Prevention:   hand hygiene promoted   rest/sleep promoted   single patient room provided  Taken 10/27/2024 0000 by Mariana Hung RN  Infection Prevention:   hand hygiene promoted   rest/sleep promoted   single patient room provided  Taken 10/26/2024 2000 by  Mariana Hung, RN  Infection Prevention:   hand hygiene promoted   rest/sleep promoted   single patient room provided  Goal: Optimal Comfort and Wellbeing  Outcome: Progressing  Intervention: Provide Person-Centered Care  Recent Flowsheet Documentation  Taken 10/27/2024 0000 by Mariana Hung RN  Trust Relationship/Rapport:   care explained   choices provided   emotional support provided   empathic listening provided   questions answered   questions encouraged   reassurance provided   thoughts/feelings acknowledged  Goal: Readiness for Transition of Care  Outcome: Progressing     Problem: Fall Injury Risk  Goal: Absence of Fall and Fall-Related Injury  Outcome: Progressing  Intervention: Promote Injury-Free Environment  Recent Flowsheet Documentation  Taken 10/27/2024 0200 by Marinaa Hung, RN  Safety Promotion/Fall Prevention:   safety round/check completed   nonskid shoes/slippers when out of bed   fall prevention program maintained   clutter free environment maintained   activity supervised   assistive device/personal items within reach  Taken 10/27/2024 0000 by Mariana Hung RN  Safety Promotion/Fall Prevention:   safety round/check completed   nonskid shoes/slippers when out of bed   fall prevention program maintained   clutter free environment maintained   activity supervised   assistive device/personal items within reach  Taken 10/26/2024 2000 by Mariana Hung, RN  Safety Promotion/Fall Prevention:   safety round/check completed   nonskid shoes/slippers when out of bed   fall prevention program maintained   clutter free environment maintained   activity supervised   assistive device/personal items within reach     Problem: Skin Injury Risk Increased  Goal: Skin Health and Integrity  Outcome: Progressing  Intervention: Optimize Skin Protection  Recent Flowsheet Documentation  Taken 10/27/2024 0000 by Mariana Hung, RN  Pressure Reduction Techniques:   heels elevated off bed   weight  shift assistance provided  Head of Bed (HOB) Positioning: HOB at 20 degrees     Problem: Sepsis/Septic Shock  Goal: Optimal Coping  Outcome: Progressing  Goal: Absence of Bleeding  Outcome: Progressing  Goal: Blood Glucose Level Within Target Range  Outcome: Progressing  Goal: Absence of Infection Signs and Symptoms  Outcome: Progressing  Intervention: Initiate Sepsis Management  Recent Flowsheet Documentation  Taken 10/27/2024 0200 by Mariana Hung RN  Infection Prevention:   hand hygiene promoted   rest/sleep promoted   single patient room provided  Taken 10/27/2024 0000 by Mariana Hung RN  Infection Prevention:   hand hygiene promoted   rest/sleep promoted   single patient room provided  Taken 10/26/2024 2000 by Mariana Hung RN  Infection Prevention:   hand hygiene promoted   rest/sleep promoted   single patient room provided  Intervention: Promote Stabilization  Recent Flowsheet Documentation  Taken 10/27/2024 0000 by Mariana Hung RN  Fluid/Electrolyte Management: fluids provided  Intervention: Promote Recovery  Recent Flowsheet Documentation  Taken 10/27/2024 0000 by Mariana Hung, RN  Airway/Ventilation Management: oxygen therapy provided  Sleep/Rest Enhancement:   awakenings minimized   noise level reduced   room darkened  Goal: Optimal Nutrition Delivery  Outcome: Progressing                                                Yes

## 2024-11-07 ENCOUNTER — APPOINTMENT (OUTPATIENT)
Dept: CARDIOLOGY | Facility: CLINIC | Age: 59
End: 2024-11-07
Payer: COMMERCIAL

## 2024-11-07 ENCOUNTER — NON-APPOINTMENT (OUTPATIENT)
Age: 59
End: 2024-11-07

## 2024-11-07 VITALS
DIASTOLIC BLOOD PRESSURE: 76 MMHG | BODY MASS INDEX: 28.77 KG/M2 | HEART RATE: 70 BPM | OXYGEN SATURATION: 96 % | HEIGHT: 66 IN | SYSTOLIC BLOOD PRESSURE: 126 MMHG | WEIGHT: 179 LBS

## 2024-11-07 DIAGNOSIS — I48.91 UNSPECIFIED ATRIAL FIBRILLATION: ICD-10-CM

## 2024-11-07 DIAGNOSIS — I25.10 ATHEROSCLEROTIC HEART DISEASE OF NATIVE CORONARY ARTERY W/OUT ANGINA PECTORIS: ICD-10-CM

## 2024-11-07 PROCEDURE — 93000 ELECTROCARDIOGRAM COMPLETE: CPT

## 2024-11-07 PROCEDURE — 99214 OFFICE O/P EST MOD 30 MIN: CPT | Mod: 25

## 2025-03-05 ENCOUNTER — APPOINTMENT (OUTPATIENT)
Dept: FAMILY MEDICINE | Facility: CLINIC | Age: 60
End: 2025-03-05
Payer: COMMERCIAL

## 2025-03-05 VITALS
BODY MASS INDEX: 29.25 KG/M2 | HEART RATE: 74 BPM | RESPIRATION RATE: 14 BRPM | DIASTOLIC BLOOD PRESSURE: 82 MMHG | SYSTOLIC BLOOD PRESSURE: 124 MMHG | WEIGHT: 182 LBS | TEMPERATURE: 97.8 F | HEIGHT: 66 IN | OXYGEN SATURATION: 98 %

## 2025-03-05 DIAGNOSIS — Z95.5 PRESENCE OF CORONARY ANGIOPLASTY IMPLANT AND GRAFT: ICD-10-CM

## 2025-03-05 DIAGNOSIS — I25.10 ATHEROSCLEROTIC HEART DISEASE OF NATIVE CORONARY ARTERY W/OUT ANGINA PECTORIS: ICD-10-CM

## 2025-03-05 DIAGNOSIS — I48.91 UNSPECIFIED ATRIAL FIBRILLATION: ICD-10-CM

## 2025-03-05 DIAGNOSIS — R73.09 OTHER ABNORMAL GLUCOSE: ICD-10-CM

## 2025-03-05 DIAGNOSIS — Z00.00 ENCOUNTER FOR GENERAL ADULT MEDICAL EXAMINATION W/OUT ABNORMAL FINDINGS: ICD-10-CM

## 2025-03-05 PROCEDURE — 99396 PREV VISIT EST AGE 40-64: CPT

## 2025-03-05 PROCEDURE — 36415 COLL VENOUS BLD VENIPUNCTURE: CPT

## 2025-03-06 LAB
ALBUMIN SERPL ELPH-MCNC: 4.6 G/DL
ALP BLD-CCNC: 87 U/L
ALT SERPL-CCNC: 39 U/L
ANION GAP SERPL CALC-SCNC: 13 MMOL/L
APPEARANCE: CLEAR
AST SERPL-CCNC: 31 U/L
BACTERIA: NEGATIVE /HPF
BASOPHILS # BLD AUTO: 0.03 K/UL
BASOPHILS NFR BLD AUTO: 0.5 %
BILIRUB SERPL-MCNC: 0.6 MG/DL
BILIRUBIN URINE: NEGATIVE
BLOOD URINE: NEGATIVE
BUN SERPL-MCNC: 15 MG/DL
CALCIUM SERPL-MCNC: 9.7 MG/DL
CAST: 0 /LPF
CHLORIDE SERPL-SCNC: 101 MMOL/L
CHOLEST SERPL-MCNC: 157 MG/DL
CO2 SERPL-SCNC: 25 MMOL/L
COLOR: NORMAL
CREAT SERPL-MCNC: 0.88 MG/DL
CREAT SPEC-SCNC: 86 MG/DL
EGFRCR SERPLBLD CKD-EPI 2021: 100 ML/MIN/1.73M2
EOSINOPHIL # BLD AUTO: 0.1 K/UL
EOSINOPHIL NFR BLD AUTO: 1.6 %
EPITHELIAL CELLS: 0 /HPF
ESTIMATED AVERAGE GLUCOSE: 120 MG/DL
GLUCOSE QUALITATIVE U: NEGATIVE MG/DL
GLUCOSE SERPL-MCNC: 87 MG/DL
HBA1C MFR BLD HPLC: 5.8 %
HCT VFR BLD CALC: 48.5 %
HDLC SERPL-MCNC: 61 MG/DL
HGB BLD-MCNC: 15.9 G/DL
IMM GRANULOCYTES NFR BLD AUTO: 0.2 %
KETONES URINE: NEGATIVE MG/DL
LDLC SERPL CALC-MCNC: 80 MG/DL
LEUKOCYTE ESTERASE URINE: NEGATIVE
LYMPHOCYTES # BLD AUTO: 2.18 K/UL
LYMPHOCYTES NFR BLD AUTO: 34.8 %
MAN DIFF?: NORMAL
MCHC RBC-ENTMCNC: 29.1 PG
MCHC RBC-ENTMCNC: 32.8 G/DL
MCV RBC AUTO: 88.8 FL
MICROALBUMIN 24H UR DL<=1MG/L-MCNC: <1.2 MG/DL
MICROALBUMIN/CREAT 24H UR-RTO: NORMAL MG/G
MICROSCOPIC-UA: NORMAL
MONOCYTES # BLD AUTO: 0.7 K/UL
MONOCYTES NFR BLD AUTO: 11.2 %
NEUTROPHILS # BLD AUTO: 3.25 K/UL
NEUTROPHILS NFR BLD AUTO: 51.7 %
NITRITE URINE: NEGATIVE
NONHDLC SERPL-MCNC: 96 MG/DL
PH URINE: 6.5
PLATELET # BLD AUTO: 294 K/UL
POTASSIUM SERPL-SCNC: 4.4 MMOL/L
PROT SERPL-MCNC: 7.3 G/DL
PROTEIN URINE: NEGATIVE MG/DL
PSA SERPL-MCNC: 0.76 NG/ML
RBC # BLD: 5.46 M/UL
RBC # FLD: 13.3 %
RED BLOOD CELLS URINE: 1 /HPF
SODIUM SERPL-SCNC: 139 MMOL/L
SPECIFIC GRAVITY URINE: 1.01
TRIGL SERPL-MCNC: 89 MG/DL
TSH SERPL-ACNC: 0.67 UIU/ML
UROBILINOGEN URINE: 0.2 MG/DL
WBC # FLD AUTO: 6.27 K/UL
WHITE BLOOD CELLS URINE: 0 /HPF

## 2025-04-24 ENCOUNTER — APPOINTMENT (OUTPATIENT)
Dept: CARDIOLOGY | Facility: CLINIC | Age: 60
End: 2025-04-24
Payer: COMMERCIAL

## 2025-04-24 ENCOUNTER — NON-APPOINTMENT (OUTPATIENT)
Age: 60
End: 2025-04-24

## 2025-04-24 VITALS
HEART RATE: 89 BPM | DIASTOLIC BLOOD PRESSURE: 86 MMHG | BODY MASS INDEX: 29.25 KG/M2 | OXYGEN SATURATION: 97 % | WEIGHT: 182 LBS | SYSTOLIC BLOOD PRESSURE: 116 MMHG | HEIGHT: 66 IN | RESPIRATION RATE: 16 BRPM

## 2025-04-24 DIAGNOSIS — I48.91 UNSPECIFIED ATRIAL FIBRILLATION: ICD-10-CM

## 2025-04-24 DIAGNOSIS — Q21.12 PATENT FORAMEN OVALE: ICD-10-CM

## 2025-04-24 DIAGNOSIS — I25.10 ATHEROSCLEROTIC HEART DISEASE OF NATIVE CORONARY ARTERY W/OUT ANGINA PECTORIS: ICD-10-CM

## 2025-04-24 PROCEDURE — 99214 OFFICE O/P EST MOD 30 MIN: CPT | Mod: 25

## 2025-04-24 PROCEDURE — 93000 ELECTROCARDIOGRAM COMPLETE: CPT

## 2025-07-28 ENCOUNTER — APPOINTMENT (OUTPATIENT)
Dept: PULMONOLOGY | Facility: CLINIC | Age: 60
End: 2025-07-28